# Patient Record
Sex: FEMALE | Race: ASIAN | NOT HISPANIC OR LATINO | URBAN - METROPOLITAN AREA
[De-identification: names, ages, dates, MRNs, and addresses within clinical notes are randomized per-mention and may not be internally consistent; named-entity substitution may affect disease eponyms.]

---

## 2017-01-11 ENCOUNTER — EMERGENCY (EMERGENCY)
Facility: HOSPITAL | Age: 47
LOS: 1 days | Discharge: LEFT BEFORE DISCHARGE | End: 2017-01-11
Attending: EMERGENCY MEDICINE | Admitting: EMERGENCY MEDICINE
Payer: COMMERCIAL

## 2017-01-11 VITALS
SYSTOLIC BLOOD PRESSURE: 158 MMHG | RESPIRATION RATE: 19 BRPM | OXYGEN SATURATION: 100 % | DIASTOLIC BLOOD PRESSURE: 87 MMHG | TEMPERATURE: 97 F | HEART RATE: 76 BPM

## 2017-01-11 PROCEDURE — 99284 EMERGENCY DEPT VISIT MOD MDM: CPT | Mod: 25

## 2017-01-11 PROCEDURE — 93010 ELECTROCARDIOGRAM REPORT: CPT | Mod: NC

## 2017-01-11 NOTE — ED ADULT TRIAGE NOTE - CHIEF COMPLAINT QUOTE
SOB and palpitation since half an hour ago while she was watching television. denies chest pain or nausea. c/o feeling dizzy and high BP -167/67

## 2017-01-12 VITALS
HEART RATE: 65 BPM | SYSTOLIC BLOOD PRESSURE: 140 MMHG | DIASTOLIC BLOOD PRESSURE: 62 MMHG | OXYGEN SATURATION: 100 % | RESPIRATION RATE: 18 BRPM

## 2017-01-12 PROCEDURE — 71010: CPT | Mod: 26

## 2017-01-12 NOTE — ED PROVIDER NOTE - CONDUCTED A DETAILED DISCUSSION WITH PATIENT AND/OR GUARDIAN REGARDING, MDM
return to ED if symptoms worsen, persist or questions arise/refused labs/need for outpatient follow-up

## 2017-01-12 NOTE — ED PROVIDER NOTE - NEUROLOGICAL, MLM
Alert and oriented, no focal deficits, no motor or sensory deficits. Alert and oriented, no focal deficits, no motor or sensory deficits. cn 2-12 intact bl. ms 5/5 bl ue and le. sensation intact bl. gait normal. finger to nose normal bl.

## 2017-01-12 NOTE — ED PROVIDER NOTE - CARE PLAN
Principal Discharge DX:	Palpitations  Instructions for follow-up, activity and diet:	pt refused labs, she wants to be discharged. Discussed return to the ED for any worsening symptoms.

## 2017-01-12 NOTE — ED PROVIDER NOTE - MEDICAL DECISION MAKING DETAILS
att47 y/o f with h/o GERD, presents with palpations, lightheadedness, sob and transient paresthesias/ subjective partial numbness/tingling in bl face and foot, bl this evening. No chest pain, no nausea. No HA. Pt states she has increase in stress, felt anxious, described like a panic attack. Pt had same exact symptoms x 2 in the past over the years. Pt was concerned her bp was high, systolic 160, and therefore came in to be seen. Pt see’s pcp and Dr Frank from [facility]. Normal stress echo 9/15. Pt states she had a recent Ct head. Normal neuro exam, clear lungs. Well appearing. Cardiac workup to r/u ACS, unlikely with presentation. Consider panic attack with negative workup. Discussed importance of f/u with pcp and cards. Pt states her pcp started anti anxiety medication but she didn’t want to take it.

## 2017-01-12 NOTE — ED PROVIDER NOTE - OBJECTIVE STATEMENT
Pt is a 46F with PMHx GERD p/w sudden onset palpitations @ 10pm today. Pt says that she was sitting and watching TV when she suddenly had a "hot flash" with head numbness, cold sweats, right foot numbness, nausea, and overall feeling of being "uncomfortable." She then felt her heart beating faster. Pt checked her BP and  so pt asked her  to take her to ED for fear of having a stroke. She repeated her BP with her home monitoring device a little while later and repeat . Pt denies fevers/chills/sweats, vomiting, diarrhea, dysuria, chest pain or SOB, lightheadedness, HA, focal neurological deficits, uncontrollable movements, anxiety/panic, LOC, or vision changes.     Of note, pt had similar episode 6/2015 with spontaneous resolution. Pt has a hx of bradycardia, which was evaluated in 2009 with a Holter monitor x1 week and was (-) for tachyarrhythmias. Stress test and TTE performed last year was wnl. Pt is a 46F with PMHx GERD p/w sudden onset palpitations @ 10pm today. Pt says that she was sitting and watching TV when she suddenly had a "hot flash" with head bl subjective numbness/tingling, cold sweats, right foot subjective numbness/tinging, nausea, and overall feeling of being "uncomfortable." She then felt her heart beating faster. Pt checked her BP and  so pt asked her  to take her to ED for fear of having a stroke. She repeated her BP with her home monitoring device a little while later and repeat . Pt denies fevers/chills/sweats, vomiting, diarrhea, dysuria, chest pain or SOB, lightheadedness, HA, focal neurological deficits, uncontrollable movements, anxiety/panic, LOC, or vision changes.     Of note, pt had similar episode with palpitations, sob, transient paresthesias 9/2015 with spontaneous resolution. Pt states she had 2 episodes like this. Pt has a hx of bradycardia, which was evaluated in 2009 with a Holter monitor x1 week and was (-) for tachyarrhythmias. Stress test and TTE performed last year was wnl.

## 2017-01-12 NOTE — ED PROVIDER NOTE - ATTENDING CONTRIBUTION TO CARE
see mdm  I have personally performed a face to face bedside history and physical examination of this patient. I have discussed the history, examination, review of systems, assessment and plan of management with the resident. I have reviewed the electronic medical record and amended it to reflect my history, review of systems, physical exam, assessment and plan.

## 2017-01-12 NOTE — ED ADULT NURSE NOTE - OBJECTIVE STATEMENT
47 yo Female received in spot 27.  Pt is A&Ox4.  Pt denies and CP or SOB.  Pt appears very anxious.  Pt states that she had an elevated BP earlier today at approx. 9pm.  BP was 167/89.  Pt has home automated wrist BP monitoring device.  Pt states that she felt her heart racing.  monitor history shows HR to be 88.  Pt states that she has seen her PCP about HTN concerns and it was determined that she does not need BP medications.  Pt states concerns that her BP is going to cause a stroke.  /52 upon RN exam of patient.  Vital signs as noted.  Will continue to monitor patient closely.  Perla WOLF.

## 2017-01-12 NOTE — ED PROVIDER NOTE - CONSTITUTIONAL, MLM
normal... Well appearing, well nourished, awake, alert, oriented to person, place, time/situation and in no apparent distress. nontoxic. comfortable. Well appearing, well nourished, awake, alert, oriented to person, place, time/situation and in no apparent distress.

## 2017-01-12 NOTE — ED PROVIDER NOTE - PROGRESS NOTE DETAILS
Pt refuses blood work in ED. She wants to go home now and f/u with her primary care doctor and cardiologist. Discussed importance of blood work and risk. she has capacity to refuse and understands the risk. Will return to ED for return of symptoms. Pt refuses blood work in ED. She wants to go home now and f/u with her primary care doctor and cardiologist. Discussed importance of blood work and risk. she has capacity to refuse and understands the risk. Will return to ED for return of symptoms.  Therefore patient is leaving before labs, signing out against medical advice. Copies of ekg given to patient.

## 2017-11-29 ENCOUNTER — INPATIENT (INPATIENT)
Facility: HOSPITAL | Age: 47
LOS: 4 days | Discharge: ANOTHER HOSPITAL | End: 2017-12-04
Attending: INTERNAL MEDICINE | Admitting: INTERNAL MEDICINE
Payer: COMMERCIAL

## 2017-11-29 VITALS
TEMPERATURE: 99 F | DIASTOLIC BLOOD PRESSURE: 57 MMHG | SYSTOLIC BLOOD PRESSURE: 116 MMHG | OXYGEN SATURATION: 100 % | HEART RATE: 67 BPM | RESPIRATION RATE: 16 BRPM

## 2017-11-29 DIAGNOSIS — I10 ESSENTIAL (PRIMARY) HYPERTENSION: ICD-10-CM

## 2017-11-29 DIAGNOSIS — I49.9 CARDIAC ARRHYTHMIA, UNSPECIFIED: ICD-10-CM

## 2017-11-29 DIAGNOSIS — R00.2 PALPITATIONS: ICD-10-CM

## 2017-11-29 PROBLEM — K21.9 GASTRO-ESOPHAGEAL REFLUX DISEASE WITHOUT ESOPHAGITIS: Chronic | Status: ACTIVE | Noted: 2017-01-12

## 2017-11-29 LAB
ALBUMIN SERPL ELPH-MCNC: 3.8 G/DL — SIGNIFICANT CHANGE UP (ref 3.3–5)
ALP SERPL-CCNC: 62 U/L — SIGNIFICANT CHANGE UP (ref 40–120)
ALT FLD-CCNC: 25 U/L — SIGNIFICANT CHANGE UP (ref 4–33)
AST SERPL-CCNC: 27 U/L — SIGNIFICANT CHANGE UP (ref 4–32)
BASOPHILS # BLD AUTO: 0.04 K/UL — SIGNIFICANT CHANGE UP (ref 0–0.2)
BASOPHILS NFR BLD AUTO: 0.7 % — SIGNIFICANT CHANGE UP (ref 0–2)
BILIRUB SERPL-MCNC: 0.4 MG/DL — SIGNIFICANT CHANGE UP (ref 0.2–1.2)
BUN SERPL-MCNC: 12 MG/DL — SIGNIFICANT CHANGE UP (ref 7–23)
CALCIUM SERPL-MCNC: 8.3 MG/DL — LOW (ref 8.4–10.5)
CHLORIDE SERPL-SCNC: 105 MMOL/L — SIGNIFICANT CHANGE UP (ref 98–107)
CK MB BLD-MCNC: 1.99 NG/ML — SIGNIFICANT CHANGE UP (ref 1–4.7)
CK MB BLD-MCNC: SIGNIFICANT CHANGE UP (ref 0–2.5)
CK SERPL-CCNC: 69 U/L — SIGNIFICANT CHANGE UP (ref 25–170)
CK SERPL-CCNC: 82 U/L — SIGNIFICANT CHANGE UP (ref 25–170)
CO2 SERPL-SCNC: 21 MMOL/L — LOW (ref 22–31)
CREAT SERPL-MCNC: 0.52 MG/DL — SIGNIFICANT CHANGE UP (ref 0.5–1.3)
EOSINOPHIL # BLD AUTO: 0.22 K/UL — SIGNIFICANT CHANGE UP (ref 0–0.5)
EOSINOPHIL NFR BLD AUTO: 4.1 % — SIGNIFICANT CHANGE UP (ref 0–6)
GLUCOSE SERPL-MCNC: 92 MG/DL — SIGNIFICANT CHANGE UP (ref 70–99)
HCT VFR BLD CALC: 38.8 % — SIGNIFICANT CHANGE UP (ref 34.5–45)
HGB BLD-MCNC: 12.8 G/DL — SIGNIFICANT CHANGE UP (ref 11.5–15.5)
IMM GRANULOCYTES # BLD AUTO: 0.01 # — SIGNIFICANT CHANGE UP
IMM GRANULOCYTES NFR BLD AUTO: 0.2 % — SIGNIFICANT CHANGE UP (ref 0–1.5)
LYMPHOCYTES # BLD AUTO: 1.95 K/UL — SIGNIFICANT CHANGE UP (ref 1–3.3)
LYMPHOCYTES # BLD AUTO: 35.9 % — SIGNIFICANT CHANGE UP (ref 13–44)
MCHC RBC-ENTMCNC: 27.8 PG — SIGNIFICANT CHANGE UP (ref 27–34)
MCHC RBC-ENTMCNC: 33 % — SIGNIFICANT CHANGE UP (ref 32–36)
MCV RBC AUTO: 84.3 FL — SIGNIFICANT CHANGE UP (ref 80–100)
MONOCYTES # BLD AUTO: 0.5 K/UL — SIGNIFICANT CHANGE UP (ref 0–0.9)
MONOCYTES NFR BLD AUTO: 9.2 % — SIGNIFICANT CHANGE UP (ref 2–14)
NEUTROPHILS # BLD AUTO: 2.71 K/UL — SIGNIFICANT CHANGE UP (ref 1.8–7.4)
NEUTROPHILS NFR BLD AUTO: 49.9 % — SIGNIFICANT CHANGE UP (ref 43–77)
NRBC # FLD: 0 — SIGNIFICANT CHANGE UP
PLATELET # BLD AUTO: 272 K/UL — SIGNIFICANT CHANGE UP (ref 150–400)
PMV BLD: 9.5 FL — SIGNIFICANT CHANGE UP (ref 7–13)
POTASSIUM SERPL-MCNC: 4 MMOL/L — SIGNIFICANT CHANGE UP (ref 3.5–5.3)
POTASSIUM SERPL-SCNC: 4 MMOL/L — SIGNIFICANT CHANGE UP (ref 3.5–5.3)
PROT SERPL-MCNC: 7.2 G/DL — SIGNIFICANT CHANGE UP (ref 6–8.3)
RBC # BLD: 4.6 M/UL — SIGNIFICANT CHANGE UP (ref 3.8–5.2)
RBC # FLD: 12.3 % — SIGNIFICANT CHANGE UP (ref 10.3–14.5)
SODIUM SERPL-SCNC: 139 MMOL/L — SIGNIFICANT CHANGE UP (ref 135–145)
TROPONIN T SERPL-MCNC: < 0.06 NG/ML — SIGNIFICANT CHANGE UP (ref 0–0.06)
TROPONIN T SERPL-MCNC: < 0.06 NG/ML — SIGNIFICANT CHANGE UP (ref 0–0.06)
TSH SERPL-MCNC: 0.64 UIU/ML — SIGNIFICANT CHANGE UP (ref 0.27–4.2)
WBC # BLD: 5.43 K/UL — SIGNIFICANT CHANGE UP (ref 3.8–10.5)
WBC # FLD AUTO: 5.43 K/UL — SIGNIFICANT CHANGE UP (ref 3.8–10.5)

## 2017-11-29 PROCEDURE — 71020: CPT | Mod: 26

## 2017-11-29 RX ORDER — ASPIRIN/CALCIUM CARB/MAGNESIUM 324 MG
325 TABLET ORAL ONCE
Qty: 0 | Refills: 0 | Status: COMPLETED | OUTPATIENT
Start: 2017-11-29 | End: 2017-11-29

## 2017-11-29 RX ORDER — ENOXAPARIN SODIUM 100 MG/ML
40 INJECTION SUBCUTANEOUS EVERY 24 HOURS
Qty: 0 | Refills: 0 | Status: DISCONTINUED | OUTPATIENT
Start: 2017-11-29 | End: 2017-12-01

## 2017-11-29 RX ADMIN — Medication 325 MILLIGRAM(S): at 09:06

## 2017-11-29 RX ADMIN — ENOXAPARIN SODIUM 40 MILLIGRAM(S): 100 INJECTION SUBCUTANEOUS at 17:04

## 2017-11-29 NOTE — H&P ADULT - NEUROLOGICAL DETAILS
alert and oriented x 3/sensation intact/normal strength/no spontaneous movement/responds to verbal commands/responds to pain

## 2017-11-29 NOTE — H&P ADULT - ASSESSMENT
48yo F w/ PMH of HTN p/w palpitations and lightheadedness x2d being admitted to telemetry for arrhythmia.

## 2017-11-29 NOTE — ED PROVIDER NOTE - OBJECTIVE STATEMENT
Pt is 46 y/o female with no significant PMhx here for eval of palpitations and sob x 3 days. As per pt she has been experiencing "sensation of heart skipping a beat", also sob (worse while laying down), denies CP, diaphoresis, neck, LUE pain/parathesias, denies jaw pain, denies prolonged immobilization, personal/family hx of coagulopathies, non smoker, not on exogenous estrogen, denies leg pain/swelling. (-) abd pain, n/v/d. Pt admits that she had similar sx in the past, had Holter last year (uneventful), also had neg echo/stress test at the beginning of the year, follows up with Dr David Frank; Denies illicit substances, coffeine/energy drinks use.

## 2017-11-29 NOTE — H&P ADULT - ADDITIONAL PE
Vitals: T-98.2, BP-117/64, HR-62, RR-16, SpO2-99  Labs: -CBC: nl. -Chem20: low CO2 and calcium. -Cardiac enzymes: negative  CXR: lungs clear, heart nl size. EKG: SR w/ PVC's

## 2017-11-29 NOTE — H&P ADULT - PROBLEM SELECTOR PLAN 1
Admit to telemetry. Admit to telemetry. serial CE's. Check FLP, A1C. F/U Cardiology c/s Dr Frank Admit to telemetry. serial CE's. Check FLP, A1C. F/U Cardiology c/s Dr Frank, EP c/s Dr Davison called

## 2017-11-29 NOTE — H&P ADULT - ATTENDING COMMENTS
47 year old female with history of Arhythmia presented with palpitations and lightheadedness for pat week  In ER, showing PVC's Bigeminys - admitted - patient symptomatic  Cardiology notified and advised to admit  serial cardiac enzymes  cardiac monitoring  check TFT   check echo'  Cardio/EPS evaluation  discussed with admitting PA  GI DVT prophylaxis

## 2017-11-29 NOTE — ED PROVIDER NOTE - FAMILY HISTORY
Father  Still living? Unknown  Family history of diabetes mellitus, Age at diagnosis: Age Unknown     Mother  Still living? Unknown  Family history of mitral valve repair, Age at diagnosis: Age Unknown

## 2017-11-29 NOTE — H&P ADULT - NSHPSOCIALHISTORY_GEN_ALL_CORE
Pt is a  48yo F currently living with her  and two children. Pt denies tobacco, alcohol and drug use. Pt is currently employed as a dialysis technician. Pt is a  48yo F currently living with her  and two children. Pt denies tobacco, alcohol and drug use.   Pt is currently employed as a dialysis technician.

## 2017-11-29 NOTE — ED ADULT NURSE REASSESSMENT NOTE - NS ED NURSE REASSESS COMMENT FT1
Received pt from LUCIANO Ha, pt A&Ox3, am Received pt from LUCIANO Ha, pt A&Ox3, ambulatory at baseline without assistance, here for evaluation of palpitations. Denies any chest pain, dizziness, nausea, vomiting, shortness of breath, diarrhea, fever, constipation, or chills. Pt feels as though here heart is pounding.  Pt on telemetry-NSR @ 68 noted with occasional unifocal PVCs. Will continue to monitor.

## 2017-11-29 NOTE — ED PROVIDER NOTE - PROGRESS NOTE DETAILS
NIESHA Ryan - spoke with pt's cardiologist David Sandra - will admit to DR Cheng's service (called and notified), DR Frank will be consulting, if EP consult needed, will call Dr Davison/

## 2017-11-29 NOTE — H&P ADULT - HISTORY OF PRESENT ILLNESS
46yo F w/ PMH of HTN p/w palpitations and lightheadedness x2d. Pt states that the symptoms have been increasing since onset. Pt states that she has experienced these sx 4 other times in the past 3 years. Each time she experienced the sx the patient presented to the ED, but a cause of her sx was not determined. Pt has received an ECHO and a stress test in the past year both of which were found to be normal. Pt was also placed on a holter monitor x1wk this past year which had no abnormal findings.  Pt also c/o easy fatigue when climbing the stairs, generalized weakness, and SOB, but could not give a time frame for these sx.  Pt states that she may have gained some weight recently, but was unsure of how much and she had recently traveled to [place] in mid-November. Pt denies fever, chills, night sweats, dyspnea, cough, syncope/LOC, claudication, lower extremity edema, and changes in urination. 46yo F w/ PMH of HTN p/w constant palpitations x2days, worse lying supine. Also c/o lightheadedness intermittently 2 days, lasting 5-10 minute per epsiode. Pt states that the symptoms have been increasing since onset. Pt has experienced these sx 4 other times in the past 3 years. Each time she experienced the sx the patient presented to the ED, but a cause of her sx was not determined. Pt has received an ECHO and a stress test in the past year both of which were found to be normal. Pt was also placed on a holter monitor x1wk this past year which had no abnormal findings reportedly.  Pt also c/o easy fatigue when climbing the stairs, generalized weakness, and SOB, but could not give a time frame for these sx.  Pt states that she may have gained some weight recently, but was unsure of how much and she had recently traveled to [place] in mid-November. Pt denies CP fever, chills, night sweats, dyspnea, cough, syncope/LOC, claudication, lower extremity edema, and changes in urination.

## 2017-11-29 NOTE — ED PROVIDER NOTE - ATTENDING CONTRIBUTION TO CARE
47F p/w palpitations, SOB and orthopnea x 2-3 days, feeling like her heart is skipping a beat.  Similar to previous episode which led to an unrevealing cardiac w/u with Dr SAMY Frank.  No caffeine.  Recent travel to [place], was not ill, no leg swelling or pain.  Also having some chest discomfort.  VS:  unremarkable    GEN - mild distress palps, sitting upright in bed A+O x3   HEAD - NC/AT     ENT - PEERL, EOMI, mucous membranes  moist , no discharge      NECK: Neck supple, non-tender without lymphadenopathy, no masses, no JVD  PULM - CTA b/l,  symmetric breath sounds  COR -  normal heart sounds    ABD - , ND, NT, soft, no guarding, no rebound, no masses    BACK - no CVA tenderness, nontender spine     EXTREMS - no edema, no deformity, warm and well perfused    SKIN - no rash or bruising      NEUROLOGIC - alert, CN 2-12 intact, sensation nl, motor 5/5 RUE/LUE/RLE/LLE.      IMP:  47F p/w palps and SOB worse with lying flat.  No JVD.  clear heart sounds.  Clear lungs.  No leg swelling or pain.  EKG reveals ventricular bigeminy.  Not intrinsically dangerous however may represent some form of cardiomyopathy vs electrolyte abnormality vs cardiac remodeling.  Check labs, CE, TSH, CXR, to be d/w cards Dr Duffy.  Pt still symptomatic, would not discharge at this point.

## 2017-11-29 NOTE — ED ADULT NURSE NOTE - OBJECTIVE STATEMENT
pt. received in room 15 , A&Ox3 c/o  low hr and palpitations since Monday. Pt . states she has a family cardiac hx and is just concern. Pt. appears comfortable in bed , HR in triage 67. Pt. states palpitations are intermittent. Pt. Vitals as noted, and in no acute distress. Will continue to monitor while in the ED.

## 2017-11-29 NOTE — ED ADULT TRIAGE NOTE - CHIEF COMPLAINT QUOTE
c.o "low heartrate", palpitations, dizziness since monday. hr normal in triage. pt states she still feels dizzy at this time. pmh bradycardia

## 2017-11-30 LAB
ALBUMIN SERPL ELPH-MCNC: 3.5 G/DL — SIGNIFICANT CHANGE UP (ref 3.3–5)
ALP SERPL-CCNC: 58 U/L — SIGNIFICANT CHANGE UP (ref 40–120)
ALT FLD-CCNC: 21 U/L — SIGNIFICANT CHANGE UP (ref 4–33)
AST SERPL-CCNC: 23 U/L — SIGNIFICANT CHANGE UP (ref 4–32)
BASOPHILS # BLD AUTO: 0.05 K/UL — SIGNIFICANT CHANGE UP (ref 0–0.2)
BASOPHILS NFR BLD AUTO: 1 % — SIGNIFICANT CHANGE UP (ref 0–2)
BILIRUB SERPL-MCNC: 0.3 MG/DL — SIGNIFICANT CHANGE UP (ref 0.2–1.2)
BUN SERPL-MCNC: 17 MG/DL — SIGNIFICANT CHANGE UP (ref 7–23)
CALCIUM SERPL-MCNC: 8.2 MG/DL — LOW (ref 8.4–10.5)
CHLORIDE SERPL-SCNC: 106 MMOL/L — SIGNIFICANT CHANGE UP (ref 98–107)
CO2 SERPL-SCNC: 24 MMOL/L — SIGNIFICANT CHANGE UP (ref 22–31)
CREAT SERPL-MCNC: 0.7 MG/DL — SIGNIFICANT CHANGE UP (ref 0.5–1.3)
EOSINOPHIL # BLD AUTO: 0.28 K/UL — SIGNIFICANT CHANGE UP (ref 0–0.5)
EOSINOPHIL NFR BLD AUTO: 5.4 % — SIGNIFICANT CHANGE UP (ref 0–6)
GLUCOSE SERPL-MCNC: 104 MG/DL — HIGH (ref 70–99)
HCT VFR BLD CALC: 37.3 % — SIGNIFICANT CHANGE UP (ref 34.5–45)
HGB BLD-MCNC: 12.6 G/DL — SIGNIFICANT CHANGE UP (ref 11.5–15.5)
IMM GRANULOCYTES # BLD AUTO: 0.01 # — SIGNIFICANT CHANGE UP
IMM GRANULOCYTES NFR BLD AUTO: 0.2 % — SIGNIFICANT CHANGE UP (ref 0–1.5)
LYMPHOCYTES # BLD AUTO: 1.59 K/UL — SIGNIFICANT CHANGE UP (ref 1–3.3)
LYMPHOCYTES # BLD AUTO: 30.5 % — SIGNIFICANT CHANGE UP (ref 13–44)
MAGNESIUM SERPL-MCNC: 2 MG/DL — SIGNIFICANT CHANGE UP (ref 1.6–2.6)
MCHC RBC-ENTMCNC: 28.6 PG — SIGNIFICANT CHANGE UP (ref 27–34)
MCHC RBC-ENTMCNC: 33.8 % — SIGNIFICANT CHANGE UP (ref 32–36)
MCV RBC AUTO: 84.8 FL — SIGNIFICANT CHANGE UP (ref 80–100)
MONOCYTES # BLD AUTO: 0.48 K/UL — SIGNIFICANT CHANGE UP (ref 0–0.9)
MONOCYTES NFR BLD AUTO: 9.2 % — SIGNIFICANT CHANGE UP (ref 2–14)
NEUTROPHILS # BLD AUTO: 2.8 K/UL — SIGNIFICANT CHANGE UP (ref 1.8–7.4)
NEUTROPHILS NFR BLD AUTO: 53.7 % — SIGNIFICANT CHANGE UP (ref 43–77)
NRBC # FLD: 0 — SIGNIFICANT CHANGE UP
PHOSPHATE SERPL-MCNC: 3.4 MG/DL — SIGNIFICANT CHANGE UP (ref 2.5–4.5)
PLATELET # BLD AUTO: 254 K/UL — SIGNIFICANT CHANGE UP (ref 150–400)
PMV BLD: 9.7 FL — SIGNIFICANT CHANGE UP (ref 7–13)
POTASSIUM SERPL-MCNC: 3.8 MMOL/L — SIGNIFICANT CHANGE UP (ref 3.5–5.3)
POTASSIUM SERPL-SCNC: 3.8 MMOL/L — SIGNIFICANT CHANGE UP (ref 3.5–5.3)
PROT SERPL-MCNC: 6.7 G/DL — SIGNIFICANT CHANGE UP (ref 6–8.3)
RBC # BLD: 4.4 M/UL — SIGNIFICANT CHANGE UP (ref 3.8–5.2)
RBC # FLD: 12.3 % — SIGNIFICANT CHANGE UP (ref 10.3–14.5)
SODIUM SERPL-SCNC: 141 MMOL/L — SIGNIFICANT CHANGE UP (ref 135–145)
WBC # BLD: 5.21 K/UL — SIGNIFICANT CHANGE UP (ref 3.8–10.5)
WBC # FLD AUTO: 5.21 K/UL — SIGNIFICANT CHANGE UP (ref 3.8–10.5)

## 2017-11-30 PROCEDURE — 93306 TTE W/DOPPLER COMPLETE: CPT | Mod: 26

## 2017-11-30 NOTE — PROGRESS NOTE ADULT - ASSESSMENT
· Assessment	  46yo F w/ PMH of HTN p/w palpitations and lightheadedness x 2d being admitted to telemetry for arrhythmia.     Problem/Plan - 1:  ·  Problem: Arrhythmia. Sinus Bradycardia Premature Beats    continue cardiac monitoring  ECHO reviewed - EF normal  Cardiology - awaiting evaluation  EPS - awaiting evaluation     Problem/Plan - 2:  ·  Problem: HTN (hypertension).    Plan: DASH diet.   off medications ; continue to monitor

## 2017-11-30 NOTE — PROGRESS NOTE ADULT - SUBJECTIVE AND OBJECTIVE BOX
Patient is a 47y old  Female who presents with a chief complaint of "my heart has been pounding" (29 Nov 2017 14:33)      SUBJECTIVE / OVERNIGHT EVENTS:  cardiac monitoring - NSR with occasional  PVC  she feels okay  Denies CP/SOB/Palpitation/HA.    MEDICATIONS  (STANDING):  enoxaparin Injectable 40 milliGRAM(s) SubCutaneous every 24 hours    MEDICATIONS  (PRN):    CAPILLARY BLOOD GLUCOSE  PHYSICAL EXAM:  GENERAL: NAD, well-developed  HEAD:  Atraumatic, Normocephalic  EYES: EOMI, PERRLA, conjunctiva and sclera clear  NECK: Supple, No JVD  CHEST/LUNG: Clear to auscultation bilaterally; No wheeze  HEART: Regular rate and rhythm; No murmurs, rubs, or gallops  ABDOMEN: Soft, Nontender, Nondistended; Bowel sounds present  EXTREMITIES:  2+ Peripheral Pulses, No clubbing, cyanosis, or edema  PSYCH: AAOx3  NEUROLOGY: non-focal  SKIN: No rashes or lesions    LABS:                        12.6   5.21  )-----------( 254      ( 30 Nov 2017 06:00 )             37.3     11-30    141  |  106  |  17  ----------------------------<  104<H>  3.8   |  24  |  0.70    Ca    8.2<L>      30 Nov 2017 06:00  Phos  3.4     11-30  Mg     2.0     11-30    TPro  6.7  /  Alb  3.5  /  TBili  0.3  /  DBili  x   /  AST  23  /  ALT  21  /  AlkPhos  58  11-30      CARDIAC MARKERS ( 29 Nov 2017 13:48 )  x     / < 0.06 ng/mL / 69 u/L / x     / x      CARDIAC MARKERS ( 29 Nov 2017 07:58 )  x     / < 0.06 ng/mL / 82 u/L / 1.99 ng/mL / x        RADIOLOGY & ADDITIONAL TESTS:    Imaging Personally Reviewed: yes    Consultant(s) Notes Reviewed:  awaiting consults    Care Discussed with Consultants/Other Providers: yes

## 2017-12-01 LAB
BUN SERPL-MCNC: 16 MG/DL — SIGNIFICANT CHANGE UP (ref 7–23)
CALCIUM SERPL-MCNC: 8.4 MG/DL — SIGNIFICANT CHANGE UP (ref 8.4–10.5)
CHLORIDE SERPL-SCNC: 104 MMOL/L — SIGNIFICANT CHANGE UP (ref 98–107)
CO2 SERPL-SCNC: 25 MMOL/L — SIGNIFICANT CHANGE UP (ref 22–31)
CREAT SERPL-MCNC: 0.64 MG/DL — SIGNIFICANT CHANGE UP (ref 0.5–1.3)
GLUCOSE SERPL-MCNC: 94 MG/DL — SIGNIFICANT CHANGE UP (ref 70–99)
HCT VFR BLD CALC: 39.7 % — SIGNIFICANT CHANGE UP (ref 34.5–45)
HGB BLD-MCNC: 13 G/DL — SIGNIFICANT CHANGE UP (ref 11.5–15.5)
MAGNESIUM SERPL-MCNC: 2.1 MG/DL — SIGNIFICANT CHANGE UP (ref 1.6–2.6)
MCHC RBC-ENTMCNC: 28 PG — SIGNIFICANT CHANGE UP (ref 27–34)
MCHC RBC-ENTMCNC: 32.7 % — SIGNIFICANT CHANGE UP (ref 32–36)
MCV RBC AUTO: 85.4 FL — SIGNIFICANT CHANGE UP (ref 80–100)
NRBC # FLD: 0 — SIGNIFICANT CHANGE UP
PLATELET # BLD AUTO: 286 K/UL — SIGNIFICANT CHANGE UP (ref 150–400)
PMV BLD: 9.9 FL — SIGNIFICANT CHANGE UP (ref 7–13)
POTASSIUM SERPL-MCNC: 3.7 MMOL/L — SIGNIFICANT CHANGE UP (ref 3.5–5.3)
POTASSIUM SERPL-SCNC: 3.7 MMOL/L — SIGNIFICANT CHANGE UP (ref 3.5–5.3)
RBC # BLD: 4.65 M/UL — SIGNIFICANT CHANGE UP (ref 3.8–5.2)
RBC # FLD: 12.4 % — SIGNIFICANT CHANGE UP (ref 10.3–14.5)
SODIUM SERPL-SCNC: 140 MMOL/L — SIGNIFICANT CHANGE UP (ref 135–145)
WBC # BLD: 4.47 K/UL — SIGNIFICANT CHANGE UP (ref 3.8–10.5)
WBC # FLD AUTO: 4.47 K/UL — SIGNIFICANT CHANGE UP (ref 3.8–10.5)

## 2017-12-01 PROCEDURE — 93010 ELECTROCARDIOGRAM REPORT: CPT

## 2017-12-01 RX ORDER — HEPARIN SODIUM 5000 [USP'U]/ML
5000 INJECTION INTRAVENOUS; SUBCUTANEOUS EVERY 12 HOURS
Qty: 0 | Refills: 0 | Status: DISCONTINUED | OUTPATIENT
Start: 2017-12-01 | End: 2017-12-04

## 2017-12-01 NOTE — PROGRESS NOTE ADULT - SUBJECTIVE AND OBJECTIVE BOX
Patient is a 47y old  Female who presents with a chief complaint of "my heart has been pounding" (29 Nov 2017 14:33)      SUBJECTIVE / OVERNIGHT EVENTS:   Feels better.  Denies CP/SOB/Palpitation/HA.  cardiac monitor - NSR with PVC's  Appreciate consult notes  MEDICATIONS  (STANDING):  heparin  Injectable 5000 Unit(s) SubCutaneous every 12 hours        I&O's Summary      PHYSICAL EXAM:  GENERAL: NAD, well-developed  HEAD:  Atraumatic, Normocephalic  EYES: EOMI, PERRLA, conjunctiva and sclera clear  NECK: Supple, No JVD  CHEST/LUNG: Clear to auscultation bilaterally; No wheeze  HEART: Regular rate and rhythm; No murmurs, rubs, or gallops  ABDOMEN: Soft, Nontender, Nondistended; Bowel sounds present  EXTREMITIES:  2+ Peripheral Pulses, No clubbing, cyanosis, or edema  PSYCH: AAOx3  NEUROLOGY: non-focal  SKIN: No rashes or lesions    LABS:                        13.0   4.47  )-----------( 286      ( 01 Dec 2017 06:09 )             39.7     12-01    140  |  104  |  16  ----------------------------<  94  3.7   |  25  |  0.64    Ca    8.4      01 Dec 2017 06:09  Phos  3.4     11-30  Mg     2.1     12-01    TPro  6.7  /  Alb  3.5  /  TBili  0.3  /  DBili  x   /  AST  23  /  ALT  21  /  AlkPhos  58  11-30        RADIOLOGY & ADDITIONAL TESTS:    Imaging Personally Reviewed: yes     Consultant(s) Notes Reviewed:  yes    Care Discussed with Consultants/Other Providers: yes and agree with plan

## 2017-12-01 NOTE — CHART NOTE - NSCHARTNOTEFT_GEN_A_CORE
Notified by RN pt's HR dropped to 36 bpm while sleeping, and initial pause of 2.1 secs followed by 2.6 seconds pause.   Patient c/o palpitations but denies any chest pain, dyspnea, dizziness, near-syncope/syncope       VITAL SIGNS:  Vital Signs Last 24 Hrs  T(C): 36.8 (01 Dec 2017 03:07), Max: 37.2 (30 Nov 2017 21:21)  T(F): 98.2 (01 Dec 2017 03:07), Max: 98.9 (30 Nov 2017 21:21)  HR: 53 (01 Dec 2017 03:07) (53 - 77)  BP: 111/57 (01 Dec 2017 03:07) (95/56 - 132/72)  RR: 17 (01 Dec 2017 03:07) (16 - 18)  SpO2: 99% (01 Dec 2017 03:07) (96% - 100%)    O:  Well developed, well nourished  middle aged Female found sitting in bed  alert and oriented x 3 appeared in no acute distress    No Known Allergies    MEDICATIONS  (STANDING):  enoxaparin Injectable 40 milliGRAM(s) SubCutaneous every 24 hours    MEDICATIONS  (PRN):                            12.6   5.21  )-----------( 254      ( 30 Nov 2017 06:00 )             37.3     11-30    141  |  106  |  17  ----------------------------<  104<H>  3.8   |  24  |  0.70    Ca    8.2<L>      30 Nov 2017 06:00  Phos  3.4     11-30  Mg     2.0     11-30    TPro  6.7  /  Alb  3.5  /  TBili  0.3  /  DBili  x   /  AST  23  /  ALT  21  /  AlkPhos  58  11-30    CARDIAC MARKERS ( 29 Nov 2017 13:48 )  x     / < 0.06 ng/mL / 69 u/L / x     / x      CARDIAC MARKERS ( 29 Nov 2017 07:58 )  x     / < 0.06 ng/mL / 82 u/L / 1.99 ng/mL / x        EKG: Sinus Bradycardia 54 bpm w frequent PVCs  Tele - Sinus cindi w frequent PVCs, intermittent bigeminy pattern    Assessment / Plan: 48 y/o female admitted with dizziness and palpitations, noted to have PVCs on tele monitor, CE x 2 neg, Echo WNL, awaiting cardiology consult with Dr Frank, and EP eval with Dr Davison pending, pt hemodynamically stable,  will continue to monitor

## 2017-12-01 NOTE — PROGRESS NOTE ADULT - SUBJECTIVE AND OBJECTIVE BOX
Pt known from PVT office and PVT MD Dr. Rubi this 48 y/o female with hx significant for HTN who has been experiencing palpitations for 2 years now worsening over last few days--more frequent and more severe. Outpatient echo and NST have been normal. Pt denies SOB, dizziness and LOC.  Medical therapy has been limited by significant sinus bradycardia.     PAST MEDICAL & SURGICAL HISTORY:  HTN (hypertension)  Gastroesophageal reflux disease without esophagitis  monomorphic PVCs  No significant past surgical history    MEDICATIONS  (STANDING):  enoxaparin Injectable 40 milliGRAM(s) SubCutaneous every 24 hours    Allergies: NKDA    FAMILY HISTORY:  Family history of mitral valve repair (Mother)  Family history of diabetes mellitus (Father)  Non-contributary for premature coronary disease or sudden cardiac death    SOCIAL HISTORY:    [x ] Non-smoker  [ ] Smoker  [ ] Alcohol      REVIEW OF SYSTEMS:  [ ]chest pain  [  ]shortness of breath  [ x ]palpitations  [  ]syncope  [ x]near syncope [ ]upper extremity weakness   [ ] lower extremity weakness  [  ]diplopia  [  ]altered mental status   [  ]fevers  [ ]chills [ ]nausea  [ ]vomitting  [  ]dysphagia    [ ]abdominal pain  [ ]melena  [ ]BRBPR    [  ]epistaxis  [  ]rash    [ ]lower extremity edema        [x ] All others negative	  [ ] Unable to obtain    PHYSICAL EXAM:  T(C): 37.1 (12-01-17 @ 13:40), Max: 37.2 (11-30-17 @ 21:21)  HR: 70 (12-01-17 @ 13:40) (53 - 77)  BP: 125/54 (12-01-17 @ 13:40) (101/58 - 132/72)  RR: 18 (12-01-17 @ 13:40) (17 - 18)  SpO2: 100% (12-01-17 @ 13:40) (96% - 100%)  Wt(kg): --    Gen: mid aged female NAD  Neck: no JVD  CVS: s1s2 irregular, no murmurs  Lungs: CTAB  Abd: soft nt/nd  Ext: no c/c/e  CNS: aao x 3 non-focal  Skin: no lesions    TELEMETRY: 	NSR with frequent PVCs  EKG shows frequent monomorphic PVCs as follows (LBBB, inferior axis, transition at V3, negative in aVL, positive in I) 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    Echo: normal  NST: normal  Cath: n/a  	  	  LABS:	 	                          13.0   4.47  )-----------( 286      ( 01 Dec 2017 06:09 )             39.7     12-01    140  |  104  |  16  ----------------------------<  94  3.7   |  25  |  0.64    Ca    8.4      01 Dec 2017 06:09  Phos  3.4     11-30  Mg     2.1     12-01    TPro  6.7  /  Alb  3.5  /  TBili  0.3  /  DBili  x   /  AST  23  /  ALT  21  /  AlkPhos  58  11-30

## 2017-12-01 NOTE — PROGRESS NOTE ADULT - ASSESSMENT
48 y/o female with hx significant for HTN who has been experiencing palpitations for 2 years now worsening over last few days--more frequent and more severe.     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 Davison for EPS and PVC ablation and possible loop recorder post procedure

## 2017-12-01 NOTE — PROGRESS NOTE ADULT - ASSESSMENT
Assessment	  46yo F w/ PMH of HTN p/w palpitations and lightheadedness x 2d being admitted to telemetry for arrhythmia.    Patient clinically stable  episode of bradycardia overnite  cardiac monitor - stable  Appreciate cardiology and EPS evaluation  for ablation on Monday       Problem/Plan - 1:  ·  Problem: Arrhythmia. Sinus Bradycardia Premature Beats    continue cardiac monitoring  ECHO reviewed - EF normal  Cardiology notes appreciated  EPS evaluation appreciated     Problem/Plan - 2:  ·  Problem: HTN (hypertension).    Plan: DASH diet.   off medications ; continue to monitor      Attending Attestation:   plan for ablation on Monday at [facility]  continue cardiac monitoring  continue current medical management  GI DVT prophylaxis.     I was physically present for the key portions of the evaluation and management (E/M) service provided.  I agree with the above history, physical, and plan which I have reviewed and edited where appropriate.     55 minutes spent on total encounter; more than 50% of the visit was spent counseling and/or coordinating care by the attending physician.     Plan discussed with family and patient and consultants

## 2017-12-01 NOTE — CONSULT NOTE ADULT - SUBJECTIVE AND OBJECTIVE BOX
EP ATTENDING      HISTORY OF PRESENT ILLNESS: She is a pleasant 48 y/o female PMH HTN who has been experiencing palpitations for 2 years. Outpatient echo and NST have been normal. 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 Medical therapy has been limited by significant sinus bradycardia. She denies high risk features such as angina or syncope.    PAST MEDICAL & SURGICAL HISTORY:  HTN (hypertension)  Gastroesophageal reflux disease without esophagitis  monomorphic PVCs  No significant past surgical history    MEDICATIONS  (STANDING):  enoxaparin Injectable 40 milliGRAM(s) SubCutaneous every 24 hours    Allergies    FAMILY HISTORY:  Family history of mitral valve repair (Mother)  Family history of diabetes mellitus (Father)  Non-contributary for premature coronary disease or sudden cardiac death    SOCIAL HISTORY:    [x ] Non-smoker  [ ] Smoker  [ ] Alcohol      REVIEW OF SYSTEMS:  [ ]chest pain  [  ]shortness of breath  [ x ]palpitations  [  ]syncope  [ x]near syncope [ ]upper extremity weakness   [ ] lower extremity weakness  [  ]diplopia  [  ]altered mental status   [  ]fevers  [ ]chills [ ]nausea  [ ]vomitting  [  ]dysphagia    [ ]abdominal pain  [ ]melena  [ ]BRBPR    [  ]epistaxis  [  ]rash    [ ]lower extremity edema        [x ] All others negative	  [ ] Unable to obtain    PHYSICAL EXAM:  T(C): 37.1 (12-01-17 @ 13:40), Max: 37.2 (11-30-17 @ 21:21)  HR: 70 (12-01-17 @ 13:40) (53 - 77)  BP: 125/54 (12-01-17 @ 13:40) (101/58 - 132/72)  RR: 18 (12-01-17 @ 13:40) (17 - 18)  SpO2: 100% (12-01-17 @ 13:40) (96% - 100%)  Wt(kg): --    no JVD  RRR, no murmurs  CTAB  soft nt/nd  no c/c/e    TELEMETRY: 	  as above  ECG:  	normal EKG    Echo: normal  NST: normal  Cath: n/a  	  	  LABS:	 	                          13.0   4.47  )-----------( 286      ( 01 Dec 2017 06:09 )             39.7     12-01    140  |  104  |  16  ----------------------------<  94  3.7   |  25  |  0.64    Ca    8.4      01 Dec 2017 06:09  Phos  3.4     11-30  Mg     2.1     12-01    TPro  6.7  /  Alb  3.5  /  TBili  0.3  /  DBili  x   /  AST  23  /  ALT  21  /  AlkPhos  58  11-30    proBNP:   Lipid Profile:   HgA1c:   TSH:     A/P) She is a pleasant 48 y/o female PMH HTN who has been experiencing palpitations for 2 years. Outpatient echo and NST have been normal. 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 Medical therapy has been limited by significant sinus bradycardia. She denies high risk features such as angina or syncope.    -given the above I discussed the R/B/A of medical therapy vs. PVC ablation. I cited a 30-40% efficacy of medical therapy, including the side effects of worsening her bradycardia but a near 0% medical risk. I cited a 75-80% efficacy with PVC ablation, along with a 3% risk of bleeding or infection and 1% risk of major complication including but not limited to heart attack, stroke, death, or need for emergency open heart surgery or pericardiocentesis. Understanding her R/B/A she elects PVC ablation given that medical therapy is limited by underlying sinus bradycardia  -check HCG  -NPO after midnight Sunday night  -will be transferred to [place] Monday 6 AM (please prepare d/c paperwork)  -PVC ablation Monday 8 AM  -d/w Dr Zac Davison M.D., RS  Cardiac Electrophysiology  [phone]

## 2017-12-02 ENCOUNTER — TRANSCRIPTION ENCOUNTER (OUTPATIENT)
Age: 47
End: 2017-12-02

## 2017-12-02 LAB
BUN SERPL-MCNC: 22 MG/DL — SIGNIFICANT CHANGE UP (ref 7–23)
CALCIUM SERPL-MCNC: 8.8 MG/DL — SIGNIFICANT CHANGE UP (ref 8.4–10.5)
CHLORIDE SERPL-SCNC: 104 MMOL/L — SIGNIFICANT CHANGE UP (ref 98–107)
CO2 SERPL-SCNC: 24 MMOL/L — SIGNIFICANT CHANGE UP (ref 22–31)
CREAT SERPL-MCNC: 0.67 MG/DL — SIGNIFICANT CHANGE UP (ref 0.5–1.3)
GLUCOSE SERPL-MCNC: 95 MG/DL — SIGNIFICANT CHANGE UP (ref 70–99)
HCG SERPL-ACNC: < 5 MIU/ML — SIGNIFICANT CHANGE UP
HCT VFR BLD CALC: 41.2 % — SIGNIFICANT CHANGE UP (ref 34.5–45)
HGB BLD-MCNC: 13.3 G/DL — SIGNIFICANT CHANGE UP (ref 11.5–15.5)
MAGNESIUM SERPL-MCNC: 2 MG/DL — SIGNIFICANT CHANGE UP (ref 1.6–2.6)
MCHC RBC-ENTMCNC: 28.1 PG — SIGNIFICANT CHANGE UP (ref 27–34)
MCHC RBC-ENTMCNC: 32.3 % — SIGNIFICANT CHANGE UP (ref 32–36)
MCV RBC AUTO: 86.9 FL — SIGNIFICANT CHANGE UP (ref 80–100)
NRBC # FLD: 0 — SIGNIFICANT CHANGE UP
PLATELET # BLD AUTO: 292 K/UL — SIGNIFICANT CHANGE UP (ref 150–400)
PMV BLD: 10 FL — SIGNIFICANT CHANGE UP (ref 7–13)
POTASSIUM SERPL-MCNC: 4.4 MMOL/L — SIGNIFICANT CHANGE UP (ref 3.5–5.3)
POTASSIUM SERPL-SCNC: 4.4 MMOL/L — SIGNIFICANT CHANGE UP (ref 3.5–5.3)
RBC # BLD: 4.74 M/UL — SIGNIFICANT CHANGE UP (ref 3.8–5.2)
RBC # FLD: 12.2 % — SIGNIFICANT CHANGE UP (ref 10.3–14.5)
SODIUM SERPL-SCNC: 142 MMOL/L — SIGNIFICANT CHANGE UP (ref 135–145)
WBC # BLD: 5.42 K/UL — SIGNIFICANT CHANGE UP (ref 3.8–10.5)
WBC # FLD AUTO: 5.42 K/UL — SIGNIFICANT CHANGE UP (ref 3.8–10.5)

## 2017-12-02 RX ADMIN — HEPARIN SODIUM 5000 UNIT(S): 5000 INJECTION INTRAVENOUS; SUBCUTANEOUS at 06:03

## 2017-12-02 NOTE — DISCHARGE NOTE ADULT - PLAN OF CARE
kalyn to [facility] for PVC ablation activity - as tolerated and with assistance   NPO on call to [facility]

## 2017-12-02 NOTE — PROGRESS NOTE ADULT - ASSESSMENT
· Assessment		  48yo F w/ PMH of HTN p/w palpitations and lightheadedness x 2d being admitted to telemetry for arrhythmia.    Patient comfortable in bed  ambulatory, no complains  cardiac monitor - frequent PVC's  episode of prolonged pauses  Cardiology and EPS on the case  plan : ablation on Monday       Problem/Plan - 1:  ·  Problem: Arrhythmia. Sinus Bradycardia Premature Beats prolonged pauses    continue cardiac monitoring  ECHO reviewed - EF normal  Cardiology on the case  EPS on the case     Problem/Plan - 2:  ·  Problem: HTN (hypertension).    Plan: DASH diet.   off medications ; continue to monitor      Attending Attestation:   appreciate Cardiology and EPS input  plan for ablation on Monday - to transfer to [facility]  continue cardiac monitoring  continue current medical management  GI DVT prophylaxis.     I was physically present for the key portions of the evaluation and management (E/M) service provided.  I agree with the above history, physical, and plan which I have reviewed and edited where appropriate.     45 minutes spent on total encounter; more than 50% of the visit was spent counseling and/or coordinating care by the attending physician.     Plan discussed with family and patient and consultants

## 2017-12-02 NOTE — PROGRESS NOTE ADULT - ASSESSMENT
46 y/o female with hx significant for HTN who has been experiencing palpitations for 2 years now worsening over last few days--more frequent and more severe.     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 Davison for EPS and PVC ablation and possible loop recorder post procedure    NPO after midnight Sun for Mon EP study/ ablation--at [facility] mon am transfer

## 2017-12-02 NOTE — DISCHARGE NOTE ADULT - CARE PROVIDER_API CALL
Stone Davison), Cardiac Electrophysiology; Cardiovascular Disease; Internal Medicine; Nuclear Cardiology  [address]  [address]  [place] [address]  Phone: [phone]  Fax: [phone]

## 2017-12-02 NOTE — PROVIDER CONTACT NOTE (OTHER) - ACTION/TREATMENT ORDERED:
Patient stable, continue to monitor, no interventions at this time. ZOLL cardiac monitor at bedside with atropine and pacing pads at patients bedside.

## 2017-12-02 NOTE — PROGRESS NOTE ADULT - SUBJECTIVE AND OBJECTIVE BOX
Subjective:   	 no anginal chest pain no near syncope intermittent palpations more noted when sitting up     MEDICATIONS:  MEDICATIONS  (STANDING):  heparin  Injectable 5000 Unit(s) SubCutaneous every 12 hours    MEDICATIONS  (PRN):      LABS:	 	    CARDIAC MARKERS:                                13.3   5.42  )-----------( 292      ( 02 Dec 2017 06:30 )             41.2     12-02    142  |  104  |  22  ----------------------------<  95  4.4   |  24  |  0.67    Ca    8.8      02 Dec 2017 06:30  Mg     2.0     12-02      COAGS:       proBNP:   Lipid Profile:   HgA1c:   TSH:       PHYSICAL EXAM:  T(C): 36.6 (12-02-17 @ 05:23), Max: 36.8 (12-01-17 @ 22:05)  HR: 68 (12-02-17 @ 05:23) (68 - 74)  BP: 111/54 (12-02-17 @ 05:23) (101/50 - 111/54)  RR: 17 (12-02-17 @ 05:23) (17 - 17)  SpO2: 100% (12-02-17 @ 05:23) (100% - 100%)  Wt(kg): --  I&O's Summary        	    Cardiovascular: Normal S1 S2,     No JVD, 1/6 CIRILO murmur, Peripheral pulses palpable 2+ bilaterally  Respiratory: Lungs clear to auscultation, normal effort 	  Gastrointestinal:  Soft, Non-tender, + BS	  Extremities no edema, cyanosis, clubbing B/L LE's       TELEMETRY: 	NSR with frequent PVCs    ECG:   frequent monomorphic PVCs as follows (LBBB, inferior axis, transition at V3, negative in aVL, positive in I) 	  RADIOLOGY:   CXR   normal chest       DIAGNOSTIC TESTING:  [ ] Echocardiogram:   EF 65%   nl lvs fx   [ ]  Catheterization:  [ ] Stress Test:    OTHER: 	      ASSESSMENT/PLAN: 	47y Female  PMH HTN who has been experiencing palpitations for 2 years. Outpatient echo and NST have been normal. 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 Medical therapy has been limited by significant sinus bradycardia. She denies high risk features such as angina or syncope.    -given the above It was discussed the R/B/A of medical therapy vs. PVC ablation. She was cited a 30-40% efficacy of medical therapy, including the side effects of worsening her bradycardia but a near 0% medical risk. She was cited a 75-80% efficacy with PVC ablation, along with a 3% risk of bleeding or infection and 1% risk of major complication including but not limited to heart attack, stroke, death, or need for emergency open heart surgery or pericardiocentesis. Understanding her R/B/A she elects PVC ablation given that medical therapy is limited by underlying sinus bradycardia  - HCG negative   -NPO after midnight Sunday night  -will be transferred to [place] Monday 6 AM (please prepare d/c paperwork)  -PVC ablation Monday 8 AM  -d/w Dr Frank

## 2017-12-02 NOTE — PROGRESS NOTE ADULT - SUBJECTIVE AND OBJECTIVE BOX
S/  Pt with frequent palpitations  No cp or SOB or dizziness    Tele NSR freq PVCs    O/  111/54  68  17  100%  97.9  Gen: mid aged female NAD  Neck: no JVP  Lungs: clear; no rales or wheezing  CVS: s1s2 irregular no gallops or murmurs  Abd: soft NT  Ext: no edema  CNS: aao x 3 non-focal    echo EF 65% mild pulm HTN

## 2017-12-02 NOTE — PROVIDER CONTACT NOTE (OTHER) - ASSESSMENT
Patient stable, patient states "I can feel palpitations when my heart rate goes too low" Vital signs: Blood pressure 105/62, heart rate 70 on cardiac monitor, 18 respiratory rate, spo2 100% room air.

## 2017-12-02 NOTE — PROGRESS NOTE ADULT - SUBJECTIVE AND OBJECTIVE BOX
Patient is a 47y old  Female who presents with a chief complaint of "my heart has been pounding" (02 Dec 2017 14:49)      SUBJECTIVE / OVERNIGHT EVENTS:  Patient denies any complains  Cardiac monitor - NSR with frequent PVC's  episode of prolonged pause but asymptomatic  Denies CP/SOB/Palpitation/HA.    MEDICATIONS  (STANDING):  heparin  Injectable 5000 Unit(s) SubCutaneous every 12 hours    MEDICATIONS  (PRN):      PHYSICAL EXAM:  GENERAL: NAD, well-developed, ambulatory   HEAD:  Atraumatic, Normocephalic  EYES: EOMI, PERRLA, conjunctiva and sclera clear  NECK: Supple, No JVD  CHEST/LUNG: Clear to auscultation bilaterally; No wheezes  HEART: Regular rate and rhythm; No murmurs, rubs, or gallops  ABDOMEN: Soft, Nontender, Nondistended; Bowel sounds present  EXTREMITIES:  2+ Peripheral Pulses, No clubbing, cyanosis, or edema  PSYCH: AAOx3  NEUROLOGY: non-focal no gross deficits  SKIN: No rashes or lesions    LABS:                        13.3   5.42  )-----------( 292      ( 02 Dec 2017 06:30 )             41.2     12-02    142  |  104  |  22  ----------------------------<  95  4.4   |  24  |  0.67    Ca    8.8      02 Dec 2017 06:30  Mg     2.0     12-02      RADIOLOGY & ADDITIONAL TESTS:    Imaging Personally Reviewed: yes    Consultant(s) Notes Reviewed:  yes    Care Discussed with Consultants/Other Providers: yes

## 2017-12-02 NOTE — PROVIDER CONTACT NOTE (OTHER) - ACTION/TREATMENT ORDERED:
No interventions at this time continue to monitor. ZOLL cardiac monitor at bedside, atropine and pacing pads at bedside.

## 2017-12-02 NOTE — DISCHARGE NOTE ADULT - CARE PLAN
Principal Discharge DX:	Arrhythmia  Goal:	tansferred to [facility] for PVC ablation  Instructions for follow-up, activity and diet:	activity - as tolerated and with assistance   NPO on call to [facility]  Secondary Diagnosis:	HTN (hypertension)

## 2017-12-02 NOTE — PROVIDER CONTACT NOTE (OTHER) - RECOMMENDATIONS
Vital signs: blood pressure 106/68, heartrate 70, spo2 100% room air, respiratory rate 18. temperature 98.0 orally.

## 2017-12-02 NOTE — DISCHARGE NOTE ADULT - HOSPITAL COURSE
48 y/o female with a PMHx of HTN and GERD presents to ED with dizziness and palpitations, found to have PVCs on tele.    + Palpitations with PVCs on tele    11/30 Echo: EF 65%, normal LVSF, mild pulm HTN  12/1/17 - Tele - SBrady w frequent PVCs,  @ 36 bpm w pause 2.13 secs followed by 2.6 secs - VSS     12/1/17 > Tele - NSR w PVCs, 2.3 sec pause   12/1/17 > Bradycardia HR 36 bpm w 2.6 sec pause - asymp VSS - Cardiac monitor @ bedside w pacing pads on pt & Atropine at bedside   EKG: NSR at 71 bpm with PVCs  CE x2: Negative  CXR: Normal chest  Echo: EF 65%.   1. Normal mitral valve. Minimal mitral regurgitation.  2. Normal left ventricular internal dimensions and wall thicknesses.  3. Endocardium not well visualized; grossly normal left ventricular systolic function.  4. Normal right ventricular size and function.  5. Estimated right ventricular systolic pressure equals 46 mm Hg, assuming right atrial pressure equals 10 mm Hg,consistent with mild pulmonary hypertension.  HCG-<5    Patient is hemodynamically stable and without complaints and patient is ready for discharge to [facility] for PVC ablation procedure 46 y/o female with a PMHx of HTN and GERD presents to ED with dizziness and palpitations, found to have PVCs on tele.    + Palpitations with PVCs on tele    12/1/17 - Tele - SBrady w frequent PVCs,  @ 36 bpm w pause 2.13 secs followed by 2.6 secs - VSS     12/1/17 > Tele - NSR w PVCs, 2.3 sec pause   12/1/17 > Bradycardia HR 36 bpm w 2.6 sec pause - asymp VSS - Cardiac monitor @ bedside w pacing pads on pt & Atropine at bedside   EKG: NSR at 71 bpm with PVCs  CE x2: Negative  CXR: Normal chest  Echo: EF 65%.   1. Normal mitral valve. Minimal mitral regurgitation.  2. Normal left ventricular internal dimensions and wall thicknesses.  3. Endocardium not well visualized; grossly normal left ventricular systolic function.  4. Normal right ventricular size and function.  5. Estimated right ventricular systolic pressure equals 46 mm Hg, assuming right atrial pressure equals 10 mm Hg,consistent with mild pulmonary hypertension.  HCG-<5    Patient is hemodynamically stable and without complaints and patient is ready for discharge to [facility] for PVC ablation procedure

## 2017-12-03 NOTE — PROGRESS NOTE ADULT - ATTENDING COMMENTS
EP ATTENDING    Patient seen and examined. Agree with above. Patient and her  elect PVC ablation. NPO after midnight, transfer to [place] EP lab at 6 AM, PVC ablation tomorrow.
- patient clinically stable  awaiting Cardiac evaluation  continue current medical management  awaiting EPS evaluation  GI DVT prophylaxis

## 2017-12-03 NOTE — PROGRESS NOTE ADULT - SUBJECTIVE AND OBJECTIVE BOX
Subjective:   	 no anginal chest pain no near syncope intermittent palpations      MEDICATIONS  (STANDING):  heparin  Injectable 5000 Unit(s) SubCutaneous every 12 hours    MEDICATIONS  (PRN):      LABS:                        13.3   5.42  )-----------( 292      ( 02 Dec 2017 06:30 )             41.2     Hemoglobin: 13.3 g/dL (12-02 @ 06:30)  Hemoglobin: 13.0 g/dL (12-01 @ 06:09)  Hemoglobin: 12.6 g/dL (11-30 @ 06:00)  Hemoglobin: 12.8 g/dL (11-29 @ 07:58)    12-02    142  |  104  |  22  ----------------------------<  95  4.4   |  24  |  0.67    Ca    8.8      02 Dec 2017 06:30  Mg     2.0     12-02      Creatinine Trend: 0.67<--, 0.64<--, 0.70<--, 0.52<--         PHYSICAL EXAM  Vital Signs Last 24 Hrs  T(C): 36.6 (03 Dec 2017 09:11), Max: 36.7 (02 Dec 2017 15:08)  T(F): 97.8 (03 Dec 2017 09:11), Max: 98 (02 Dec 2017 15:08)  HR: 46 (03 Dec 2017 09:11) (33 - 70)  BP: 108/45 (03 Dec 2017 09:11) (98/48 - 108/45)  BP(mean): --  RR: 19 (03 Dec 2017 09:11) (17 - 19)  SpO2: 100% (03 Dec 2017 09:11) (99% - 100%)    Cardiovascular: Normal S1 S2,     No JVD, 1/6 CIRILO murmur, Peripheral pulses palpable 2+ bilaterally  Respiratory: Lungs clear to auscultation, normal effort 	  Gastrointestinal:  Soft, Non-tender, + BS	  Extremities no edema, cyanosis, clubbing B/L LE's       TELEMETRY: 	NSR with frequent PVCs    ECG:   frequent monomorphic PVCs as follows (LBBB, inferior axis, transition at V3, negative in aVL, positive in I) 	  RADIOLOGY:   CXR   normal chest       DIAGNOSTIC TESTING:  [ ] Echocardiogram:   EF 65%   nl lvs fx   [ ]  Catheterization:  [ ] Stress Test:    OTHER: 	      ASSESSMENT/PLAN: 	47y Female  PMH HTN who has been experiencing palpitations for 2 years. Outpatient echo and NST have been normal. 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 Medical therapy has been limited by significant sinus bradycardia. She denies high risk features such as angina or syncope.    -given the above It was discussed the R/B/A of medical therapy vs. PVC ablation. She was cited a 30-40% efficacy of medical therapy, including the side effects of worsening her bradycardia but a near 0% medical risk. She was cited a 75-80% efficacy with PVC ablation, along with a 3% risk of bleeding or infection and 1% risk of major complication including but not limited to heart attack, stroke, death, or need for emergency open heart surgery or pericardiocentesis. Understanding her R/B/A she elects PVC ablation given that medical therapy is limited by underlying sinus bradycardia  - HCG negative   -NPO after midnight  tonight--patient aware  -will be transferred to [place] Monday 6 AM (please prepare d/c paperwork)-d/w tele PA   -PVC ablation Monday 8 AM  -d/w Dr Frank

## 2017-12-03 NOTE — PROGRESS NOTE ADULT - SUBJECTIVE AND OBJECTIVE BOX
Patient is a 47y old  Female who presents with a chief complaint of "my heart has been pounding" (02 Dec 2017 14:49)      SUBJECTIVE / OVERNIGHT EVENTS:   Patient denies any complains  Episode of bradycardia early AM   no complains at that time   Cardiac monitor - NSR with PVC's    MEDICATIONS  (STANDING):  heparin  Injectable 5000 Unit(s) SubCutaneous every 12 hours    MEDICATIONS  (PRN):      PHYSICAL EXAM:  GENERAL: NAD, well-developed ambulatory  HEAD:  Atraumatic, Normocephalic  EYES: EOMI, PERRLA, conjunctiva and sclera clear  NECK: Supple, No JVD  CHEST/LUNG: Clear to auscultation bilaterally; No wheezes  HEART: Regular rate and rhythm; No murmurs, rubs, or gallops  ABDOMEN: Soft, Nontender, Nondistended; Bowel sounds present  EXTREMITIES:  2+ Peripheral Pulses, No clubbing, cyanosis, or edema  PSYCH: AAOx3 apprehensive but affect appropriate  NEUROLOGY: non-focal  SKIN: No rashes or lesions    LABS:                        13.3   5.42  )-----------( 292      ( 02 Dec 2017 06:30 )             41.2     12-02    142  |  104  |  22  ----------------------------<  95  4.4   |  24  |  0.67    Ca    8.8      02 Dec 2017 06:30  Mg     2.0     12-02        RADIOLOGY & ADDITIONAL TESTS:    Imaging Personally Reviewed: yes    Consultant(s) Notes Reviewed:  yes and agreed with plan    Care Discussed with Consultants/Other Providers: yes

## 2017-12-03 NOTE — PROGRESS NOTE ADULT - ASSESSMENT
· Assessment		  46yo F w/ PMH of HTN p/w palpitations and lightheadedness x 2d being admitted to telemetry for arrhythmia.    Patient clinically stable  cardiac monitor - frequent PVC's  episode of bradycardia early AM  asymptomatic at that time  ambulates with no difficulty  Cardiology and EPS on the case  plan : ablation on Monday       Problem/Plan - 1:  ·  Problem: Arrhythmia. Bradycardia Premature Beats prolonged pauses  continue cardiac monitoring  NPO tonight for transfer to [facility] in the morning for ablation  ECHO reviewed - EF normal  Cardiology on the case  EPS on the case     Problem/Plan - 2:  ·  Problem: HTN (hypertension).    Plan: DASH diet.   off medications ; continue to monitor      Attending Attestation:   continue close cardiac monitoring  continue current medical management  For transfer to [facility] for ablation in the morning  discussed plan in detail with patient and family  GI DVT prophylaxis.     I was physically present for the key portions of the evaluation and management (E/M) service provided.  I agree with the above history, physical, and plan which I have reviewed and edited where appropriate.     45 minutes spent on total encounter; more than 50% of the visit was spent counseling and/or coordinating care by the attending physician.     Plan discussed with family and patient and consultants

## 2017-12-04 ENCOUNTER — TRANSCRIPTION ENCOUNTER (OUTPATIENT)
Age: 47
End: 2017-12-04

## 2017-12-04 ENCOUNTER — INPATIENT (INPATIENT)
Facility: HOSPITAL | Age: 47
LOS: 0 days | Discharge: HOME | DRG: 274 | End: 2017-12-05
Attending: INTERNAL MEDICINE | Admitting: INTERNAL MEDICINE
Payer: COMMERCIAL

## 2017-12-04 VITALS
SYSTOLIC BLOOD PRESSURE: 116 MMHG | OXYGEN SATURATION: 100 % | TEMPERATURE: 98 F | HEART RATE: 64 BPM | DIASTOLIC BLOOD PRESSURE: 64 MMHG | RESPIRATION RATE: 17 BRPM

## 2017-12-04 VITALS
RESPIRATION RATE: 16 BRPM | HEART RATE: 67 BPM | WEIGHT: 126.99 LBS | DIASTOLIC BLOOD PRESSURE: 61 MMHG | OXYGEN SATURATION: 100 % | HEIGHT: 60 IN | TEMPERATURE: 98 F | SYSTOLIC BLOOD PRESSURE: 124 MMHG

## 2017-12-04 DIAGNOSIS — I49.3 VENTRICULAR PREMATURE DEPOLARIZATION: ICD-10-CM

## 2017-12-04 DIAGNOSIS — I44.2 ATRIOVENTRICULAR BLOCK, COMPLETE: ICD-10-CM

## 2017-12-04 PROCEDURE — 93306 TTE W/DOPPLER COMPLETE: CPT | Mod: 26

## 2017-12-04 PROCEDURE — 93010 ELECTROCARDIOGRAM REPORT: CPT

## 2017-12-04 RX ORDER — HEPARIN SODIUM 5000 [USP'U]/ML
1 INJECTION INTRAVENOUS; SUBCUTANEOUS
Qty: 0 | Refills: 0 | COMMUNITY

## 2017-12-04 RX ORDER — SODIUM CHLORIDE 9 MG/ML
1000 INJECTION INTRAMUSCULAR; INTRAVENOUS; SUBCUTANEOUS
Qty: 0 | Refills: 0 | Status: DISCONTINUED | OUTPATIENT
Start: 2017-12-04 | End: 2017-12-05

## 2017-12-04 RX ORDER — SODIUM CHLORIDE 9 MG/ML
3 INJECTION INTRAMUSCULAR; INTRAVENOUS; SUBCUTANEOUS EVERY 8 HOURS
Qty: 0 | Refills: 0 | Status: DISCONTINUED | OUTPATIENT
Start: 2017-12-04 | End: 2017-12-05

## 2017-12-04 RX ADMIN — SODIUM CHLORIDE 3 MILLILITER(S): 9 INJECTION INTRAMUSCULAR; INTRAVENOUS; SUBCUTANEOUS at 21:13

## 2017-12-04 RX ADMIN — SODIUM CHLORIDE 200 MILLILITER(S): 9 INJECTION INTRAMUSCULAR; INTRAVENOUS; SUBCUTANEOUS at 14:54

## 2017-12-04 RX ADMIN — SODIUM CHLORIDE 3 MILLILITER(S): 9 INJECTION INTRAMUSCULAR; INTRAVENOUS; SUBCUTANEOUS at 13:37

## 2017-12-04 NOTE — CHART NOTE - NSCHARTNOTEFT_GEN_A_CORE
episodic     Patient s/p PVC ablation    Post procedure hypotensive 87/50 when arrived from PACU. Asymptomatic- no complaints    O/E   A+O x 4  Pulm CTA noemi  CV SR RRR no M/G/R  abd soft, non tender +BS  right groin site benign, no hematoma, no bleeding +DP    Plan  D/W Dr Davison  IV Fluid bolus given with good result  Stat ECHO done -no pericardial effusion    /50, HR 75, RR 16 at this time

## 2017-12-04 NOTE — PROGRESS NOTE ADULT - SUBJECTIVE AND OBJECTIVE BOX
Patient is a 47y old  Female who presents with a chief complaint of "my heart has been pounding" (02 Dec 2017 14:49)      SUBJECTIVE / OVERNIGHT EVENTS:  NPO awaiting transfer to [facility] for ablation  Denies CP/SOB/Palpitation/HA.    MEDICATIONS  (STANDING):  heparin  Injectable 5000 Unit(s) SubCutaneous every 12 hours    MEDICATIONS  (PRN):        CAPILLARY BLOOD GLUCOSE        I&O's Summary      PHYSICAL EXAM:  GENERAL: NAD, well-developed  HEAD:  Atraumatic, Normocephalic  EYES: EOMI, PERRLA, conjunctiva and sclera clear  NECK: Supple, No JVD  CHEST/LUNG: Clear to auscultation bilaterally; No wheeze  HEART: Regular rate and rhythm; No murmurs, rubs, or gallops  ABDOMEN: Soft, Nontender, Nondistended; Bowel sounds present  EXTREMITIES:  2+ Peripheral Pulses, No clubbing, cyanosis, or edema  PSYCH: AAOx3  NEUROLOGY: non-focal  SKIN: No rashes or lesions      RADIOLOGY & ADDITIONAL TESTS:    Imaging Personally Reviewed: yes    Consultant(s) Notes Reviewed:  yes    Care Discussed with Consultants/Other Providers: yes

## 2017-12-04 NOTE — PROGRESS NOTE ADULT - SUBJECTIVE AND OBJECTIVE BOX
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 Medical therapy is limited by baseline sinus bradycardia    -keep NPO  -PVC ablation today  -expect d/c home tomorrow  -f/u with me at Dr. Frank's office on Tuesday December 12th at 1 PM

## 2017-12-04 NOTE — H&P CARDIOLOGY - PMH
Gastroesophageal reflux disease without esophagitis    HTN (hypertension)    PVC's (premature ventricular contractions)

## 2017-12-04 NOTE — DISCHARGE NOTE ADULT - PLAN OF CARE
Your heartbeat will be controlled. Your catheter/groin site will be free of infection and severe bleeding. Appointments: please call [phone] for follow-up appointment with your electrophysiology (EP) cardiologist within 2-3 weeks after discharge.   Groin Site Care: You can take shower in 24 hours. Keep the area clean and dry. No submersion of your catheter/groin site in bath tubs, pools, or any water for 1 week.   Activity: No driving for 24 hours. No strenuous activity or heavy lifting more than 10 pounds for 1 week.  You may need to: Quit smoking. Limit your alcohol intake.   Call Your Doctor immediately if you have: A fast or irregular heart beat; lightheadedness; severe chest pain or shortness of breath; fever or chills; bleeding, pain, redness, swelling, warmth or drainage at or near the catheter/groin site at [phone]

## 2017-12-04 NOTE — PROGRESS NOTE ADULT - SUBJECTIVE AND OBJECTIVE BOX
EP ADDENDUM    s/p PVC ablation x 2  no acute complications    Plan  -bed rest x 4 hours  -ekg  -no need for medications  -d/c home tomorrow morning  -f/u with me at Dr. Frank's office on Tuesday December 12th at 1 PM      Stone Davison MD, RS

## 2017-12-04 NOTE — DISCHARGE NOTE ADULT - CARE PROVIDER_API CALL
Stone Davison), Cardiac Electrophysiology; Cardiovascular Disease; Internal Medicine; Nuclear Cardiology  [address] [facility]  [address]  [place] [address]  Phone: [phone]  Fax: [phone]

## 2017-12-04 NOTE — DISCHARGE NOTE ADULT - PATIENT PORTAL LINK FT
“You can access the FollowHealth Patient Portal, offered by [facility], by registering with the following website: [email or web address]”

## 2017-12-04 NOTE — PROGRESS NOTE ADULT - ASSESSMENT
Assessment		  46yo F w/ PMH of HTN p/w palpitations and lightheadedness x 2d being admitted to telemetry for arrhythmia.    Patient [id number] stable  NPO for transfer to [facility]   for ablation as per EPS  cardiac monitor - frequent PVC's  episode of bradycardia   asymptomatic   ambulates with no difficulty  Cardiology and EPS on the case         Problem/Plan - 1:  ·  Problem: Arrhythmia. Bradycardia Premature Beats prolonged pauses  continue cardiac monitoring  NPO tonight for transfer to [facility] in the morning for ablation  ECHO reviewed - EF normal  Cardiology on the case  EPS on the case     Problem/Plan - 2:  ·  Problem: HTN (hypertension).    Plan: DASH diet.   off medications ; continue to monitor      Attending Attestation:   For ablation today at [facility]  NPO for now  discussed plan in detail with patient and family  GI DVT prophylaxis.     I was physically present for the key portions of the evaluation and management (E/M) service provided.  I agree with the above history, physical, and plan which I have reviewed and edited where appropriate.     40 minutes spent on total encounter; more than 50% of the visit was spent counseling and/or coordinating care by the attending physician.     Plan discussed with family and patient and consultants

## 2017-12-04 NOTE — PATIENT PROFILE ADULT. - MENTAL HEALTH CONDITIONS/SYMPTOMS, PROFILE
PT ACUTE  Treatment Session          Pt seen on 10 nursing unit.                                                Frequency Comments: SATM-F                                                                                                                Admitting complaint: brain mass                                     Precautions  Weight Bearing Status: Weight bearing as tolerated right lower extremity (08/04/17 0751)  Weight Bearing Status Comments: Cam boot at all times  (08/04/17 0751)  Other Precautions: Fall risk (08/03/17 1114)  Precautions Comments: CAM boot (08/03/17 1114)    SUBJECTIVE: Patient's Personal Goal: to get stronger (08/04/17 0751)  Subjective: Pt. agreeable to session.  (08/04/17 0751)  Subjective/Objective Comments: Okay to see pt. Jason RN  (08/04/17 0751)    OBJECTIVE:  Basic Lines: Capped IV (08/04/17 0751)  Safety Measures: Alarms (08/04/17 0751)    RN reported Banda Fall Scale Score: 65    ASSESSMENT:   *  Pt overall mobility is supervision with gait due to pt. Requiring cues at times to stay inside close to the ww for safety. Pt progressed in transfers with going to and from sit to stand. Patient limited today by fatigue. Pt ambulated in aparicio with ww. Pt will benefit from continued PT to work on stairs to allow for maximal mobility. Pt's previous level of independence was I. Patient returned to bed  post session, alarm set, and call light within reach. *       Other Therapeutic Intervention: Increased time spent educating pt on safety with mobility, ambulating with staff, recommendation for therapy prior to return home due to history of falls.  (08/03/17 0593)     EDUCATION:   On this date, the patient was educated on importance of therapy.    The response to education was: Verbalizes understanding    PT Identified Barriers to Discharge: lives alone, impaired mobility, hx of falls     PLAN:   Continue skilled PT, including the following Treatment/Interventions: Functional transfer training  (08/04/17 0751)   Frequency Comments: SATM-F  (08/04/17 0751)    Treatment Plan for Next Session: LE strengthening, balance activities and obstacle negotiation  Additional Plan Considerations: stairs       RECOMMENDATIONS FOR DISCHARGE:  Recommendation for Discharge: PT: Sub-acute nursing home (08/04/17 0751)    PT/OT Mobility Equipment for Discharge: has cane, 2WW, 4WW (08/04/17 0751)  PT/OT ADL Equipment for Discharge: continue to assess (08/04/17 0751)    ICU Mobility Assesment (PERME):       Last 24 hours of Functional Data  Bed Mobility   Bed Mobility  Supine to Sit: Modified Independent (08/04/17 0751)  Sit to Supine: Modified Independent (08/04/17 0751)  Bed Mobility Comments: HOB elevated, increased time to complete (08/03/17 0935)    Transfers  Transfers  Sit to Stand: Modified Independent (08/04/17 0751)  Stand to Sit: Supervision (Supv);Modified Independent (08/04/17 0751)  Stand Pivot Transfers: Supervision (Supv) (08/04/17 0751)  Assistive Device/: 2-wheeled walker (08/04/17 0751)  Transfer Comments 1: cues for hand placement  (08/04/17 0751)      Gait  Gait  Gait Assistance: Supervision (Supv) (08/04/17 0751)  Assistive Device/: 2-wheeled walker (08/04/17 0751)  Ambulation Distance (Feet): 220 Feet (08/04/17 0751)  Pattern: Decreased stance time R (08/04/17 0751)  Ambulation Surface: Tile (08/03/17 0935)  Gait Comments 1: cues for upright posture and with staying in close to ww at times for safety  (08/04/17 0751)  Second Trial: Yes (08/04/17 0751), Gait - Second Trial  Gait Assistance: Supervision (Supv) (08/04/17 0751)  Assistive Device/: 2-wheeled walker (08/04/17 0751)  Ambulation Distance (Feet): 35 Feet (08/04/17 0751)  Pattern: Shuffle (08/04/17 0751)  Ambulation Surface: Tile (08/04/17 0751)  Gait Comments 2: as above  (08/04/17 0751)    Stairs  Stairs Mobility  Number of Stairs: 4 (08/04/17 0751)  Stair Management Assistance: Minimal Assist (Min)  (08/04/17 0751)  Stair Management Technique: Two hands one rail (08/04/17 0751)  Stairs Mobility Comments: minimal  assist at times to steady pt.  (08/04/17 0751)       Neuromuscular Re-education       Balance  Balance  Sitting - Static: Modified Independent (08/04/17 0751)  Sitting - Dynamic: Modified Independent (08/04/17 0751)  Standing - Static: Supervision (Supv) (08/04/17 0751)  Standing - Dynamic (eyes open): Supervision (Supv) (08/04/17 0751)  Balance Comments #1: no gross LOB or buckling noted. Use of 2ww for UE support (08/03/17 0935)    Wheelchair Mobility       Patient's Personal Goal: to get stronger (08/04/17 0751)    Therapy Goals:    Goals  Short Term Goals to Be Reviewed On: 08/07/17 (08/04/17 0751)  Short Term Goals = Discharge Goals: Yes (08/04/17 0751)  Goal Agreement: Patient agrees with goals and treatment plan (08/04/17 0751)  Bed Mobility Discharge Goal: independent from  flat bed  (08/04/17 0751)  Bed Mobility Discharge Goal Progress: Outcome not met, continue to monitor (08/04/17 0751)  Transfer Discharge Goal: mod I  with  ww (08/04/17 0751)  Transfer Discharge Goal Progress: Outcome not met, continue to monitor (08/04/17 0751)  Ambulation Discharge Goal: 75' with  ww and mod I  (08/04/17 0751)  Ambulation Discharge Goal Progress: Outcome not met, continue to monitor (08/04/17 0751)  Stairs Discharge Goal: negotiate 3 steps with  rail and mod I  (08/04/17 0751)  Stairs Discharge Goal Progress: Outcome not met, continue to monitor (08/04/17 0751)        PT Time Spent: 55 minutes (08/04/17 0751)    See PT flowsheet for full details regarding the PT therapy provided.       none

## 2017-12-04 NOTE — DISCHARGE NOTE ADULT - HOSPITAL COURSE
47 yr old Eritrean female with PMH of htn, PVC arrhythmia (on Holter monitor) and GERD presents to [facility]  ED on 11/30 with dizziness and palpitations.  Patient reports history of intermittent palpitations for past 5 years. This episode began a month ago but is more severe -accompanied by dizziness and orthopnea. Denies syncope or CP. On arrival to ED she was  found to have frequent  PVCs on tele and was transferred to [facility] for further management. Pt is now s/p PVC ablation via right groin. Pt tolerated the procedure well. Site benign. Post-procedure discharge instructions discussed and questions addressed. 47 yr old Russian female with PMH of htn, PVC arrhythmia (on Holter monitor) and GERD presents to [facility]  ED on 11/30 with dizziness and palpitations.  Patient reports history of intermittent palpitations for past 5 years. This episode began a month ago but is more severe -accompanied by dizziness and orthopnea. Denies syncope or CP. On arrival to ED she was  found to have frequent  PVCs on tele and was transferred to [facility] for further management. Pt is now s/p PVC ablation via right groin. Pt tolerated the procedure well. Site benign. Post-procedure discharge instructions discussed and questions addressed.      Patient ambulating without complaint of dizziness, WALDEN or CP. Reports feeling some intermittent palps overnight. Monitor SR 60s with PVCs and 2.1 second pause.  Dr Davison notified and reviewed arrhythmia- cleared for discharge to home. Labs stable. VSS. Groin site stable. Patient will follow up on 12/12 w Dr Davison in Dr Frank's office.

## 2017-12-04 NOTE — H&P CARDIOLOGY - HISTORY OF PRESENT ILLNESS
47 yr old Nepalese female with PMH of htn and PVC arrhythmia and  GERD presents to [facility]  ED on 11/30 with dizziness and palpitations  for 1 month. On admission,  found to have frequent  PVCs on tele    12/1/17 - Tele - SBrady w frequent PVCs,  @ 36 bpm w pause 2.13 secs followed by 2.6 secs - VSS     12/1/17 > Tele - NSR w PVCs, 2.3 sec pause   12/1/17 > Bradycardia HR 36 bpm w 2.6 sec pause - asymp VSS - Cardiac monitor @ bedside w pacing pads on pt & Atropine at bedside   EKG: NSR at 71 bpm with PVCs  CE x2: Negative  CXR: Normal chest  Echo: EF 65%.   1. Normal mitral valve. Minimal mitral regurgitation.  2. Normal left ventricular internal dimensions and wall thicknesses.  3. Endocardium not well visualized; grossly normal left ventricular systolic function.  4. Normal right ventricular size and function.  5. Estimated right ventricular systolic pressure equals 46 mm Hg, assuming right atrial pressure equals 10 mm Hg,consistent with mild pulmonary hypertension.  HCG-<5    Patient is hemodynamically stable and without complaints and patient is ready for discharge to [facility] for PVC ablation procedure    brandt presented to [facility] 47 yr old [place]ese female with PMH of htn, PVC arrhythmia (on Holter monitor) and GERD presents to [facility]  ED on 11/30 with dizziness and palpitations.  Patient reports history of intermittent palpitations for past 5 years. This episode began a month ago but is more severe -accompanied by dizziness and orthopnea. Denies syncope or CP. On arrival to ED she was  found to have frequent  PVCs on tele.  12/1/17 - Tele - SBrady w frequent PVCs,  @ 36 bpm w pause 2.13 secs followed by 2.6 secs - VSS   12/1/17 > Tele - NSR w PVCs, 2.3 sec pause   12/1/17 > Bradycardia HR 36 bpm w 2.6 sec pause - asymp VSS - Cardiac monitor @ bedside w pacing pads on pt & Atropine at bedside   EKG: NSR at 71 bpm with PVCs  CE x2: Negative  CXR: Normal chest  Echo: EF 65%.   1. Normal mitral valve. Minimal mitral regurgitation.  2. Normal left ventricular internal dimensions and wall thicknesses.  3. Endocardium not well visualized; grossly normal left ventricular systolic function.  4. Normal right ventricular size and function.  5. Estimated right ventricular systolic pressure equals 46 mm Hg, assuming right atrial pressure equals 10 mm Hg, consistent with mild pulmonary hypertension.  HCG-<5    Patient is hemodynamically stable and without complaints and transferred to [facility] today for PVC ablation with Dr Davison.

## 2017-12-04 NOTE — DISCHARGE NOTE ADULT - CARE PLAN
Principal Discharge DX:	PVC's (premature ventricular contractions)  Goal:	Your heartbeat will be controlled. Your catheter/groin site will be free of infection and severe bleeding.  Instructions for follow-up, activity and diet:	Appointments: please call [phone] for follow-up appointment with your electrophysiology (EP) cardiologist within 2-3 weeks after discharge.   Groin Site Care: You can take shower in 24 hours. Keep the area clean and dry. No submersion of your catheter/groin site in bath tubs, pools, or any water for 1 week.   Activity: No driving for 24 hours. No strenuous activity or heavy lifting more than 10 pounds for 1 week.  You may need to: Quit smoking. Limit your alcohol intake.   Call Your Doctor immediately if you have: A fast or irregular heart beat; lightheadedness; severe chest pain or shortness of breath; fever or chills; bleeding, pain, redness, swelling, warmth or drainage at or near the catheter/groin site at [phone]

## 2017-12-05 VITALS
TEMPERATURE: 98 F | RESPIRATION RATE: 17 BRPM | HEART RATE: 65 BPM | SYSTOLIC BLOOD PRESSURE: 115 MMHG | DIASTOLIC BLOOD PRESSURE: 54 MMHG | OXYGEN SATURATION: 98 %

## 2017-12-05 DIAGNOSIS — I49.3 VENTRICULAR PREMATURE DEPOLARIZATION: ICD-10-CM

## 2017-12-05 LAB
ANION GAP SERPL CALC-SCNC: 11 MMOL/L — SIGNIFICANT CHANGE UP (ref 5–17)
BUN SERPL-MCNC: 12 MG/DL — SIGNIFICANT CHANGE UP (ref 7–23)
CALCIUM SERPL-MCNC: 8.2 MG/DL — LOW (ref 8.4–10.5)
CHLORIDE SERPL-SCNC: 105 MMOL/L — SIGNIFICANT CHANGE UP (ref 96–108)
CO2 SERPL-SCNC: 23 MMOL/L — SIGNIFICANT CHANGE UP (ref 22–31)
CREAT SERPL-MCNC: 0.6 MG/DL — SIGNIFICANT CHANGE UP (ref 0.5–1.3)
GLUCOSE SERPL-MCNC: 106 MG/DL — HIGH (ref 70–99)
HCT VFR BLD CALC: 35.2 % — SIGNIFICANT CHANGE UP (ref 34.5–45)
HCT VFR BLD CALC: 38.9 % — SIGNIFICANT CHANGE UP (ref 34.5–45)
HGB BLD-MCNC: 11.7 G/DL — SIGNIFICANT CHANGE UP (ref 11.5–15.5)
HGB BLD-MCNC: 12.8 G/DL — SIGNIFICANT CHANGE UP (ref 11.5–15.5)
MCHC RBC-ENTMCNC: 29 PG — SIGNIFICANT CHANGE UP (ref 27–34)
MCHC RBC-ENTMCNC: 29.1 PG — SIGNIFICANT CHANGE UP (ref 27–34)
MCHC RBC-ENTMCNC: 32.9 GM/DL — SIGNIFICANT CHANGE UP (ref 32–36)
MCHC RBC-ENTMCNC: 33.1 GM/DL — SIGNIFICANT CHANGE UP (ref 32–36)
MCV RBC AUTO: 87.7 FL — SIGNIFICANT CHANGE UP (ref 80–100)
MCV RBC AUTO: 88.2 FL — SIGNIFICANT CHANGE UP (ref 80–100)
PLATELET # BLD AUTO: 237 K/UL — SIGNIFICANT CHANGE UP (ref 150–400)
PLATELET # BLD AUTO: 260 K/UL — SIGNIFICANT CHANGE UP (ref 150–400)
POTASSIUM SERPL-MCNC: 4 MMOL/L — SIGNIFICANT CHANGE UP (ref 3.5–5.3)
POTASSIUM SERPL-SCNC: 4 MMOL/L — SIGNIFICANT CHANGE UP (ref 3.5–5.3)
RBC # BLD: 4.01 M/UL — SIGNIFICANT CHANGE UP (ref 3.8–5.2)
RBC # BLD: 4.41 M/UL — SIGNIFICANT CHANGE UP (ref 3.8–5.2)
RBC # FLD: 11.3 % — SIGNIFICANT CHANGE UP (ref 10.3–14.5)
RBC # FLD: 11.3 % — SIGNIFICANT CHANGE UP (ref 10.3–14.5)
SODIUM SERPL-SCNC: 139 MMOL/L — SIGNIFICANT CHANGE UP (ref 135–145)
WBC # BLD: 6.9 K/UL — SIGNIFICANT CHANGE UP (ref 3.8–10.5)
WBC # BLD: 8.1 K/UL — SIGNIFICANT CHANGE UP (ref 3.8–10.5)
WBC # FLD AUTO: 6.9 K/UL — SIGNIFICANT CHANGE UP (ref 3.8–10.5)
WBC # FLD AUTO: 8.1 K/UL — SIGNIFICANT CHANGE UP (ref 3.8–10.5)

## 2017-12-05 PROCEDURE — 93306 TTE W/DOPPLER COMPLETE: CPT

## 2017-12-05 PROCEDURE — 93010 ELECTROCARDIOGRAM REPORT: CPT

## 2017-12-05 PROCEDURE — 80048 BASIC METABOLIC PNL TOTAL CA: CPT

## 2017-12-05 PROCEDURE — C1766: CPT

## 2017-12-05 PROCEDURE — 93654 COMPRE EP EVAL TX VT: CPT

## 2017-12-05 PROCEDURE — 85027 COMPLETE CBC AUTOMATED: CPT

## 2017-12-05 PROCEDURE — 93005 ELECTROCARDIOGRAM TRACING: CPT

## 2017-12-05 PROCEDURE — G0378: CPT

## 2017-12-05 PROCEDURE — 93623 PRGRMD STIMJ&PACG IV RX NFS: CPT

## 2017-12-05 PROCEDURE — C1894: CPT

## 2017-12-05 PROCEDURE — C1732: CPT

## 2017-12-05 RX ADMIN — SODIUM CHLORIDE 3 MILLILITER(S): 9 INJECTION INTRAMUSCULAR; INTRAVENOUS; SUBCUTANEOUS at 06:42

## 2017-12-05 NOTE — PROGRESS NOTE ADULT - SUBJECTIVE AND OBJECTIVE BOX
Patient is a 47y old  Female who presents with a chief complaint of PVC arrhythmia (04 Dec 2017 20:33)          Allergies    No Known Allergies    Intolerances        Medications:  sodium chloride 0.9% lock flush 3 milliLiter(s) IV Push every 8 hours  sodium chloride 0.9%. 1000 milliLiter(s) IV Continuous <Continuous>      Vitals:  T(C): 36.4 (12-05-17 @ 05:15), Max: 36.4 (12-05-17 @ 05:15)  HR: 53 (12-05-17 @ 05:15) (53 - 89)  BP: 90/54 (12-05-17 @ 05:15) (89/51 - 103/51)  BP(mean): --  RR: 17 (12-05-17 @ 05:15) (17 - 17)  SpO2: 98% (12-05-17 @ 05:15) (98% - 100%)  Wt(kg): --  Daily     Daily   I&O's Summary    04 Dec 2017 07:01  -  05 Dec 2017 07:00  --------------------------------------------------------  IN: 420 mL / OUT: 0 mL / NET: 420 mL    05 Dec 2017 07:01  -  05 Dec 2017 09:55  --------------------------------------------------------  IN: 240 mL / OUT: 0 mL / NET: 240 mL          Physical Exam:  Appearance: Normal  Eyes: PERRL, EOMI  HENT: Normal oral [place], NC/AT  Cardiovascular: S1S2, RRR, No M/R/G, no JVD, No Lower extremity edema  Procedural Access Site: No hematoma, Non-tender to palpation, 2+ pulse, No bruit, No Ecchymosis  Respiratory: Clear to auscultation bilaterally  Gastrointestinal: Soft, Non tender, Normal Bowel Sounds  Musculoskeletal: No clubbing, No joint deformity   Neurologic: Non-focal  Lymphatic: No lymphadenopathy  Psychiatry: AAOx3, Mood & affect appropriate  Skin: No rashes, No ecchymoses, No cyanosis    12-05    139  |  105  |  12  ----------------------------<  106<H>  4.0   |  23  |  0.60    Ca    8.2<L>      05 Dec 2017 03:49              Lipid panel   Hgb A1c         ECG:    Echo:    Stress Testing:     Cath:    Imaging:    Interpretation of Telemetry: SB 45-60s with PVCs

## 2017-12-05 NOTE — PROGRESS NOTE ADULT - ASSESSMENT
47 yr old Mongolian female with PMH of htn, PVC arrhythmia (on Holter monitor) and GERD presents to [facility]  ED on 11/30 with dizziness and palpitations.  Patient reports history of intermittent palpitations for past 5 years. This episode began a month ago but is more severe -accompanied by dizziness and orthopnea. Denies syncope or CP. On arrival to ED she was  found to have frequent  PVCs on tele.  12/1/17 - Tele - SBrady w frequent PVCs,  @ 36 bpm w pause 2.13 secs followed by 2.6 secs - VSS   12/1/17 > Tele - NSR w PVCs, 2.3 sec pause   12/1/17 > Bradycardia HR 36 bpm w 2.6 sec pause - asymp VSS - Cardiac monitor @ bedside w pacing pads on pt & Atropine at bedside   EKG: NSR at 71 bpm with PVCs  CE x2: Negative  CXR: Normal chest  Echo: EF 65%.   1. Normal mitral valve. Minimal mitral regurgitation.  2. Normal left ventricular internal dimensions and wall thicknesses.  3. Endocardium not well visualized; grossly normal left ventricular systolic function.  4. Normal right ventricular size and function.  5. Estimated right ventricular systolic pressure equals 46 mm Hg, assuming right atrial pressure equals 10 mm Hg, consistent with mild pulmonary hypertension.  HCG-<5    Patient is hemodynamically stable and without complaints and transferred to [facility] today for PVC ablation with Dr Davison.     s/[p PVC ablation 12/4. Tolerated procedure well. Was hypotensive post procedure yesterday - given IV fluid bolus w subsequent increase in BP and ECHO done which ruled out effusion.    ambulating around unit today- no dizziness or WALDEN. No Pain. +palpitations intermittently (less that before)  Right groin stable. H/H stable  EKG and rhythm strips reviewed by Dr Davison -and cleared for discharge to home. F/U 12/12 w Dr Davison

## 2018-09-07 ENCOUNTER — INPATIENT (INPATIENT)
Facility: HOSPITAL | Age: 48
LOS: 3 days | Discharge: HOME | End: 2018-09-11
Attending: INTERNAL MEDICINE | Admitting: INTERNAL MEDICINE
Payer: COMMERCIAL

## 2018-09-07 VITALS
DIASTOLIC BLOOD PRESSURE: 93 MMHG | RESPIRATION RATE: 16 BRPM | SYSTOLIC BLOOD PRESSURE: 146 MMHG | HEART RATE: 66 BPM | OXYGEN SATURATION: 100 % | TEMPERATURE: 98 F

## 2018-09-07 DIAGNOSIS — R00.2 PALPITATIONS: ICD-10-CM

## 2018-09-07 PROBLEM — I49.3 VENTRICULAR PREMATURE DEPOLARIZATION: Chronic | Status: ACTIVE | Noted: 2017-12-04

## 2018-09-07 PROBLEM — I10 ESSENTIAL (PRIMARY) HYPERTENSION: Chronic | Status: ACTIVE | Noted: 2017-11-29

## 2018-09-07 LAB
ALBUMIN SERPL ELPH-MCNC: 4 G/DL — SIGNIFICANT CHANGE UP (ref 3.3–5)
ALP SERPL-CCNC: 62 U/L — SIGNIFICANT CHANGE UP (ref 40–120)
ALT FLD-CCNC: 16 U/L — SIGNIFICANT CHANGE UP (ref 4–33)
APTT BLD: 36.5 SEC — SIGNIFICANT CHANGE UP (ref 27.5–37.4)
AST SERPL-CCNC: 22 U/L — SIGNIFICANT CHANGE UP (ref 4–32)
BASOPHILS # BLD AUTO: 0.04 K/UL — SIGNIFICANT CHANGE UP (ref 0–0.2)
BASOPHILS NFR BLD AUTO: 0.6 % — SIGNIFICANT CHANGE UP (ref 0–2)
BILIRUB SERPL-MCNC: 0.5 MG/DL — SIGNIFICANT CHANGE UP (ref 0.2–1.2)
BUN SERPL-MCNC: 12 MG/DL — SIGNIFICANT CHANGE UP (ref 7–23)
CALCIUM SERPL-MCNC: 9.4 MG/DL — SIGNIFICANT CHANGE UP (ref 8.4–10.5)
CHLORIDE SERPL-SCNC: 99 MMOL/L — SIGNIFICANT CHANGE UP (ref 98–107)
CO2 SERPL-SCNC: 21 MMOL/L — LOW (ref 22–31)
CREAT SERPL-MCNC: 0.63 MG/DL — SIGNIFICANT CHANGE UP (ref 0.5–1.3)
EOSINOPHIL # BLD AUTO: 0.21 K/UL — SIGNIFICANT CHANGE UP (ref 0–0.5)
EOSINOPHIL NFR BLD AUTO: 2.9 % — SIGNIFICANT CHANGE UP (ref 0–6)
GLUCOSE SERPL-MCNC: 99 MG/DL — SIGNIFICANT CHANGE UP (ref 70–99)
HCT VFR BLD CALC: 38.6 % — SIGNIFICANT CHANGE UP (ref 34.5–45)
HGB BLD-MCNC: 12.8 G/DL — SIGNIFICANT CHANGE UP (ref 11.5–15.5)
IMM GRANULOCYTES # BLD AUTO: 0.02 # — SIGNIFICANT CHANGE UP
IMM GRANULOCYTES NFR BLD AUTO: 0.3 % — SIGNIFICANT CHANGE UP (ref 0–1.5)
INR BLD: 1.05 — SIGNIFICANT CHANGE UP (ref 0.88–1.17)
LYMPHOCYTES # BLD AUTO: 2.43 K/UL — SIGNIFICANT CHANGE UP (ref 1–3.3)
LYMPHOCYTES # BLD AUTO: 34.1 % — SIGNIFICANT CHANGE UP (ref 13–44)
MCHC RBC-ENTMCNC: 28.4 PG — SIGNIFICANT CHANGE UP (ref 27–34)
MCHC RBC-ENTMCNC: 33.2 % — SIGNIFICANT CHANGE UP (ref 32–36)
MCV RBC AUTO: 85.8 FL — SIGNIFICANT CHANGE UP (ref 80–100)
MONOCYTES # BLD AUTO: 0.63 K/UL — SIGNIFICANT CHANGE UP (ref 0–0.9)
MONOCYTES NFR BLD AUTO: 8.8 % — SIGNIFICANT CHANGE UP (ref 2–14)
NEUTROPHILS # BLD AUTO: 3.8 K/UL — SIGNIFICANT CHANGE UP (ref 1.8–7.4)
NEUTROPHILS NFR BLD AUTO: 53.3 % — SIGNIFICANT CHANGE UP (ref 43–77)
NRBC # FLD: 0 — SIGNIFICANT CHANGE UP
PLATELET # BLD AUTO: 275 K/UL — SIGNIFICANT CHANGE UP (ref 150–400)
PMV BLD: 10.1 FL — SIGNIFICANT CHANGE UP (ref 7–13)
POTASSIUM SERPL-MCNC: 3.6 MMOL/L — SIGNIFICANT CHANGE UP (ref 3.5–5.3)
POTASSIUM SERPL-SCNC: 3.6 MMOL/L — SIGNIFICANT CHANGE UP (ref 3.5–5.3)
PROT SERPL-MCNC: 7.7 G/DL — SIGNIFICANT CHANGE UP (ref 6–8.3)
PROTHROM AB SERPL-ACNC: 11.7 SEC — SIGNIFICANT CHANGE UP (ref 9.8–13.1)
RBC # BLD: 4.5 M/UL — SIGNIFICANT CHANGE UP (ref 3.8–5.2)
RBC # FLD: 11.6 % — SIGNIFICANT CHANGE UP (ref 10.3–14.5)
SODIUM SERPL-SCNC: 136 MMOL/L — SIGNIFICANT CHANGE UP (ref 135–145)
TROPONIN T, HIGH SENSITIVITY: < 6 NG/L — SIGNIFICANT CHANGE UP (ref ?–14)
WBC # BLD: 7.13 K/UL — SIGNIFICANT CHANGE UP (ref 3.8–10.5)
WBC # FLD AUTO: 7.13 K/UL — SIGNIFICANT CHANGE UP (ref 3.8–10.5)

## 2018-09-07 PROCEDURE — 71046 X-RAY EXAM CHEST 2 VIEWS: CPT | Mod: 26

## 2018-09-07 PROCEDURE — 99233 SBSQ HOSP IP/OBS HIGH 50: CPT

## 2018-09-07 PROCEDURE — 93306 TTE W/DOPPLER COMPLETE: CPT | Mod: 26

## 2018-09-07 RX ORDER — PANTOPRAZOLE SODIUM 20 MG/1
40 TABLET, DELAYED RELEASE ORAL
Qty: 0 | Refills: 0 | Status: DISCONTINUED | OUTPATIENT
Start: 2018-09-07 | End: 2018-09-11

## 2018-09-07 RX ORDER — INFLUENZA VIRUS VACCINE 15; 15; 15; 15 UG/.5ML; UG/.5ML; UG/.5ML; UG/.5ML
0.5 SUSPENSION INTRAMUSCULAR ONCE
Qty: 0 | Refills: 0 | Status: DISCONTINUED | OUTPATIENT
Start: 2018-09-07 | End: 2018-09-11

## 2018-09-07 RX ORDER — HEPARIN SODIUM 5000 [USP'U]/ML
5000 INJECTION INTRAVENOUS; SUBCUTANEOUS EVERY 12 HOURS
Qty: 0 | Refills: 0 | Status: DISCONTINUED | OUTPATIENT
Start: 2018-09-07 | End: 2018-09-11

## 2018-09-07 NOTE — ED ADULT NURSE NOTE - OBJECTIVE STATEMENT
Pt received in exam room 17. alert and oriented x3, ambulatory. presents to ED co palpitations x 1 week, pt states " I just ignored the pain". also co "indigestion" x "few months". hx of ablation in past. denies dizziness, nausea, cp, sob. pt states she decided to go to ED today because she checked her heart rate and it was 40. Heart rate ranges from 39-60 in exam room. Placed on pacer pads. Labs sent. IV placed. VS as stated. Comfort measures provided. Awaiting bedside echo. will continue to monitor.

## 2018-09-07 NOTE — ED PROVIDER NOTE - OBJECTIVE STATEMENT
48F h/o ablation (2/2 frequent PVCs in 2017) presents with one week of intermittent palpitations and heartburn, tried antacids, which didn't help. She denies taking any medications. In ER on monitor she goes into and out of sinus bradycardia (40s). She admits to orthopnea for over a year, no new symptoms. No fever, chills, N/V/D.

## 2018-09-07 NOTE — H&P ADULT - HISTORY OF PRESENT ILLNESS
49 yo F h.o GERD, PVC s/p ablation 2017 with one week of palpitations. + SOB associated with palpitations. Also reported orthopnea ( sleeps on 3 pillows) x 1 year. NO chest pain, SOB at rest, WALDEN, PND, , diaphoresis, lightheadedness, dizziness, syncope, increased lower extremity edema, fever chills, malaise, myalgias, anorexia, weight changes ( loss or gain), night sweats, generalized fatigue abdominal pain, N/V/C/D BRBPR, melena, urinary symptoms, cough, and wheezing.

## 2018-09-07 NOTE — ED ADULT NURSE REASSESSMENT NOTE - NS ED NURSE REASSESS COMMENT FT1
Pt seen by telel PA.  HR varies 40s-50s.  PT transferred with tele montior with RN and tech as per Tele PA.  PT is asymptomatic.  Handoff given to LUCIANO Corey.

## 2018-09-07 NOTE — ED ADULT NURSE NOTE - NSIMPLEMENTINTERV_GEN_ALL_ED
Implemented All Universal Safety Interventions:  [place] to call system. Call bell, personal items and telephone within reach. Instruct patient to call for assistance. Room bathroom lighting operational. Non-slip footwear when patient is off stretcher. Physically safe environment: no spills, clutter or unnecessary equipment. Stretcher in lowest position, wheels locked, appropriate side rails in place.

## 2018-09-07 NOTE — H&P ADULT - FAMILY HISTORY
Father  Still living? Unknown  Family history of diabetes mellitus, Age at diagnosis: Age Unknown  Family history of mitral valve repair, Age at diagnosis: Age Unknown

## 2018-09-07 NOTE — H&P ADULT - ADDITIONAL PE
EKG: SR @ 63 bpm +RBBB Flipped Ts V1-V3 III, AVF  Tele rate regular @ 35-50 bpm  NO other arrythmias

## 2018-09-07 NOTE — ED ADULT TRIAGE NOTE - CHIEF COMPLAINT QUOTE
intermittent palpitations x past wk.   indigestion feeling to ch intermittent x mos. states today noted pulse 40.   c/o diff breathing since yesterday  pmh- oblation,a fib  on no meds

## 2018-09-07 NOTE — H&P ADULT - ATTENDING COMMENTS
pt seen and examined  patient is a 48 year old woman with history of ablation (2/2 frequent PVCs in 2017) off of avn medications now presenting with one week of intermittent palpitations and chest discomfort she believes is secondary to GERD.     Patient denies any dyspnea, cough, syncope, edema, exertional symptoms, nausea, abdominal pain, fever, chills,  or rash.  Denies any history of CAD, MI, valve disease, cardiomyopathy, or congenital heart disease.  in er, pt with sinus cindi and evidence of sa node exit block   hd stable  eps evaluated pt and pt being admitted for monitoring and poss ppm    pt in the past has seen dr. lucero/ozzy kearney and dr memo farah but now is requesting a new cardiology provider    vitals stable  nad aao x3 nc/at neck supple no jvd  cv s1s2 rrr lungs clear b/l  abd soft, nt  ext no edema    A/P    PVC, s/p ablation  bradycardia, exit block   chest pain     1. Exit block , bradycardia  symptomatic cindi  on no avn med at home  no recent otc meds  check labs, tsh  echo nl with nl lv and valve fxn  eps f/u   cont to monitor for ppm need  retrieve old ablation report for type of ablation/location    2. Chest pain, atypical   could be secondary to gerd vs symptomatic cindi  ecg wirg rbbb no acute ischemic changes  echo with nl lv fxn  yesenia  asa  ppi    d/w eps  dvt ppx   ER on monitor she goes into and out of sinus bradycardia (40s). She admits to orthopnea for over a year, no new symptoms. No fever, chills, N/V/D.

## 2018-09-07 NOTE — CONSULT NOTE ADULT - SUBJECTIVE AND OBJECTIVE BOX
CHIEF COMPLAINT: Asked by ED attending to evaluate patient with symptomatic bradycardia   HISTORY OF PRESENT ILLNESS:  Ms. Woods is a pleasant 48 y/o female presents in with complaints of palpitations associated with shortness of breath, WALDEN and dizziness for the past 2 weeks.     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 She initially attributed this to indigestion and has been taking antacids with no relief. ho has been experiencing palpitations for 2 years.     Denies taking any meds. The only change in her life style is- starting Von exercise class that worsened her symptoms. Additionally, reports doing her house hold chores has become a great burden ( doing laundry, going to her basement etc ).     EKG shows sinus bradycardia ~40's with sinus exit block. An echocardiogram performed today showed normal LV function. Minimal MR, mild TR.   Called to evaluate.    	  PAST MEDICAL & SURGICAL HISTORY:  PVC's (premature ventricular contractions)  HTN (hypertension)  Gastroesophageal reflux disease without esophagitis  No significant past surgical history    PREVIOUS DIAGNOSTIC TESTING:      < from: Transthoracic Echocardiogram (09.07.18 @ 12:01) >  CONCLUSIONS:  1. Mildly thickened mitral valve. Minimal mitral  regurgitation.  2. Normal left ventricular internal dimensions and wall  thicknesses.  3. Endocardium not well visualized; grossly normal left  ventricular systolic function.  4. The right ventricle is not well visualized; grossly  normal right ventricular systolic function.  5. Normal tricuspid valve. Mild tricuspid regurgitation.  6. Estimated pulmonary artery systolic pressure equals 37  mm Hg, assuming right atrial pressure equals 10  mm Hg,  consistent with borderline pulmonary hypertension.  *** Compared with echocardiogram of 11/30/2017, no  significant changes noted.  ------------------------------------------------------------------------    < end of copied text >  [ ]  Catheterization:  [ ] Stress Test:  	    MEDICATIONS:  heparin  Injectable 5000 Unit(s) SubCutaneous every 12 hours  pantoprazole    Tablet 40 milliGRAM(s) Oral before breakfast    FAMILY HISTORY:  Family history of mitral valve repair (Father)  Family history of diabetes mellitus (Father)    SOCIAL HISTORY:    [x ] Non-smoker  [ ] Smoker  [ ] Alcohol    Allergies  No Known Allergies    Intolerances:   REVIEW OF SYSTEMS:  CONSTITUTIONAL: No fever, weight loss, or fatigue  EYES: No eye pain, visual disturbances, or discharge  ENMT:  No difficulty hearing, tinnitus, vertigo; No sinus or throat pain  NECK: No pain or stiffness  RESPIRATORY: No cough, wheezing, chills or hemoptysis; No Shortness of Breath  CARDIOVASCULAR: No chest pain, + SOB and WALDEN, +palpitations, passing out, +dizziness, or leg swelling  GASTROINTESTINAL: No abdominal or epigastric pain. No nausea, vomiting, or hematemesis; No diarrhea or constipation. No melena or hematochezia.  GENITOURINARY: No dysuria, frequency, hematuria, or incontinence  NEUROLOGICAL: No headaches, memory loss, loss of strength, numbness, or tremors  SKIN: No itching, burning, rashes, or lesions   LYMPH Nodes: No enlarged glands  ENDOCRINE: No heat or cold intolerance; No hair loss  MUSCULOSKELETAL: No joint pain or swelling; No muscle, back, or extremity pain  PSYCHIATRIC: No depression, anxiety, mood swings, or difficulty sleeping  HEME/LYMPH: No easy bruising, or bleeding gums  ALLERY AND IMMUNOLOGIC: No hives or eczema	    [ x] All others negative	  [ ] Unable to obtain    PHYSICAL EXAM:  T(C): 36.6 (09-07-18 @ 16:51), Max: 36.9 (09-07-18 @ 10:59)  HR: 59 (09-07-18 @ 16:51) (49 - 67)  BP: 112/64 (09-07-18 @ 16:51) (111/48 - 146/93)  RR: 16 (09-07-18 @ 16:51) (16 - 18)  SpO2: 100% (09-07-18 @ 16:51) (100% - 100%)  Wt(kg): --  I&O's Summary      Appearance: Normal	  HEENT:   Normal oral mucosa, PERRL, EOMI	  Lymphatic: No lymphadenopathy  Cardiovascular: Normal S1 S2, No JVD, No murmurs, No edema  Respiratory: Lungs clear to auscultation	  Psychiatry: A & O x 3, Mood & affect appropriate  Gastrointestinal:  Soft, Non-tender, + BS	  Skin: No rashes, No ecchymoses, No cyanosis	  Neurologic: Non-focal  Extremities: Normal range of motion, No clubbing, cyanosis or edema  Vascular: Peripheral pulses palpable 2+ bilaterally    TELEMETRY: SB 40's with sinus exit block 	                          12.8   7.13  )-----------( 275      ( 07 Sep 2018 11:30 )             38.6     09-07    136  |  99  |  12  ----------------------------<  99  3.6   |  21<L>  |  0.63    Ca    9.4      07 Sep 2018 11:30    TPro  7.7  /  Alb  4.0  /  TBili  0.5  /  DBili  x   /  AST  22  /  ALT  16  /  AlkPhos  62  09-07      ASSESSMENT/PLAN: In summary, 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     EF preserved.    Although it is likely she would benefit from a bradycardia device therapy, would review prior records of the ablation  2) Obtain cardiac MRI   3) continue tele monitoring  4) Keep pacing pads on   5) Maintain K > 4.0 and Mg > 1.8  6) Keep patient NPO for possible PPM Monday.     Thank you.     Elisabet Nix,FNP-C  [id number] CHIEF COMPLAINT: Asked by ED attending to evaluate patient with symptomatic bradycardia   HISTORY OF PRESENT ILLNESS:  Ms. Woods is a pleasant 46 y/o female presents in with complaints of palpitations associated with shortness of breath, WALDEN and dizziness for the past 2 weeks.     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 She initially attributed this to indigestion and has been taking antacids with no relief. ho has been experiencing palpitations for 2 years.     Denies taking any meds. The only change in her life style is- starting Von exercise class that worsened her symptoms. Additionally, reports doing her house hold chores has become a great burden ( doing laundry, going to her basement etc ).     EKG shows sinus bradycardia ~40's with sinus exit block. An echocardiogram performed today showed normal LV function. Minimal MR, mild TR.   Called to evaluate.    	  PAST MEDICAL & SURGICAL HISTORY:  PVC's (premature ventricular contractions)  HTN (hypertension)  Gastroesophageal reflux disease without esophagitis  No significant past surgical history    PREVIOUS DIAGNOSTIC TESTING:      < from: Transthoracic Echocardiogram (09.07.18 @ 12:01) >  CONCLUSIONS:  1. Mildly thickened mitral valve. Minimal mitral  regurgitation.  2. Normal left ventricular internal dimensions and wall  thicknesses.  3. Endocardium not well visualized; grossly normal left  ventricular systolic function.  4. The right ventricle is not well visualized; grossly  normal right ventricular systolic function.  5. Normal tricuspid valve. Mild tricuspid regurgitation.  6. Estimated pulmonary artery systolic pressure equals 37  mm Hg, assuming right atrial pressure equals 10  mm Hg,  consistent with borderline pulmonary hypertension.  *** Compared with echocardiogram of 11/30/2017, no  significant changes noted.  ------------------------------------------------------------------------    < end of copied text >  [ ]  Catheterization:  [ ] Stress Test:  	    MEDICATIONS:  heparin  Injectable 5000 Unit(s) SubCutaneous every 12 hours  pantoprazole    Tablet 40 milliGRAM(s) Oral before breakfast    FAMILY HISTORY:  Family history of mitral valve repair (Father)  Family history of diabetes mellitus (Father)    SOCIAL HISTORY:    [x ] Non-smoker  [ ] Smoker  [ ] Alcohol    Allergies  No Known Allergies    Intolerances:   REVIEW OF SYSTEMS:  CONSTITUTIONAL: No fever, weight loss, or fatigue  EYES: No eye pain, visual disturbances, or discharge  ENMT:  No difficulty hearing, tinnitus, vertigo; No sinus or throat pain  NECK: No pain or stiffness  RESPIRATORY: No cough, wheezing, chills or hemoptysis; No Shortness of Breath  CARDIOVASCULAR: No chest pain, + SOB and WALDEN, +palpitations, passing out, +dizziness, or leg swelling  GASTROINTESTINAL: No abdominal or epigastric pain. No nausea, vomiting, or hematemesis; No diarrhea or constipation. No melena or hematochezia.  GENITOURINARY: No dysuria, frequency, hematuria, or incontinence  NEUROLOGICAL: No headaches, memory loss, loss of strength, numbness, or tremors  SKIN: No itching, burning, rashes, or lesions   LYMPH Nodes: No enlarged glands  ENDOCRINE: No heat or cold intolerance; No hair loss  MUSCULOSKELETAL: No joint pain or swelling; No muscle, back, or extremity pain  PSYCHIATRIC: No depression, anxiety, mood swings, or difficulty sleeping  HEME/LYMPH: No easy bruising, or bleeding gums  ALLERY AND IMMUNOLOGIC: No hives or eczema	    [ x] All others negative	  [ ] Unable to obtain    PHYSICAL EXAM:  T(C): 36.6 (09-07-18 @ 16:51), Max: 36.9 (09-07-18 @ 10:59)  HR: 59 (09-07-18 @ 16:51) (49 - 67)  BP: 112/64 (09-07-18 @ 16:51) (111/48 - 146/93)  RR: 16 (09-07-18 @ 16:51) (16 - 18)  SpO2: 100% (09-07-18 @ 16:51) (100% - 100%)  Wt(kg): --  I&O's Summary      Appearance: Normal	  HEENT:   Normal oral mucosa, PERRL, EOMI	  Lymphatic: No lymphadenopathy  Cardiovascular: Normal S1 S2, No JVD, No murmurs, No edema  Respiratory: Lungs clear to auscultation	  Psychiatry: A & O x 3, Mood & affect appropriate  Gastrointestinal:  Soft, Non-tender, + BS	  Skin: No rashes, No ecchymoses, No cyanosis	  Neurologic: Non-focal  Extremities: Normal range of motion, No clubbing, cyanosis or edema  Vascular: Peripheral pulses palpable 2+ bilaterally    TELEMETRY: SB 40's with sinus exit block 	                          12.8   7.13  )-----------( 275      ( 07 Sep 2018 11:30 )             38.6     09-07    136  |  99  |  12  ----------------------------<  99  3.6   |  21<L>  |  0.63    Ca    9.4      07 Sep 2018 11:30    TPro  7.7  /  Alb  4.0  /  TBili  0.5  /  DBili  x   /  AST  22  /  ALT  16  /  AlkPhos  62  09-07      ASSESSMENT/PLAN: In summary, 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     EF preserved.    Although it is likely she would benefit from a bradycardia device therapy, would review prior records of the ablation  2) Obtain cardiac MRI   3) continue tele monitoring  4) Keep pacing pads on   5) Maintain K > 4.0 and Mg > 1.8  6) Keep patient NPO for possible PPM Monday pending results of MRI .     Thank you.     Elisabet Nix,[facility]-C  [id number]

## 2018-09-07 NOTE — ED PROVIDER NOTE - MEDICAL DECISION MAKING DETAILS
48F p/w intermittent bradycardia/palpitations. Willg et EKG< basic labs, trop. EP consult. Reinoso: 48F p/w intermittent bradycardia/palpitations. Will get EKG, basic labs, trop. EP consult.

## 2018-09-07 NOTE — H&P ADULT - PROBLEM SELECTOR PLAN 1
due to symptomatic bradycardia  TTE ordered  EP consult appreciated  for Cardiac MRI  Will eventually need PPM due to symptomatic bradycardia  TTE ordered  EP consult appreciated  serum HCG negative in flowsheets  for Cardiac MRI  Will eventually need PPM  case dw EP and Dr Hammond

## 2018-09-08 LAB
BASOPHILS # BLD AUTO: 0.04 K/UL — SIGNIFICANT CHANGE UP (ref 0–0.2)
BASOPHILS NFR BLD AUTO: 0.8 % — SIGNIFICANT CHANGE UP (ref 0–2)
BUN SERPL-MCNC: 11 MG/DL — SIGNIFICANT CHANGE UP (ref 7–23)
CALCIUM SERPL-MCNC: 9 MG/DL — SIGNIFICANT CHANGE UP (ref 8.4–10.5)
CHLORIDE SERPL-SCNC: 103 MMOL/L — SIGNIFICANT CHANGE UP (ref 98–107)
CO2 SERPL-SCNC: 24 MMOL/L — SIGNIFICANT CHANGE UP (ref 22–31)
CREAT SERPL-MCNC: 0.57 MG/DL — SIGNIFICANT CHANGE UP (ref 0.5–1.3)
EOSINOPHIL # BLD AUTO: 0.2 K/UL — SIGNIFICANT CHANGE UP (ref 0–0.5)
EOSINOPHIL NFR BLD AUTO: 4 % — SIGNIFICANT CHANGE UP (ref 0–6)
GLUCOSE SERPL-MCNC: 90 MG/DL — SIGNIFICANT CHANGE UP (ref 70–99)
HCT VFR BLD CALC: 37.8 % — SIGNIFICANT CHANGE UP (ref 34.5–45)
HGB BLD-MCNC: 12.5 G/DL — SIGNIFICANT CHANGE UP (ref 11.5–15.5)
IMM GRANULOCYTES # BLD AUTO: 0.01 # — SIGNIFICANT CHANGE UP
IMM GRANULOCYTES NFR BLD AUTO: 0.2 % — SIGNIFICANT CHANGE UP (ref 0–1.5)
LYMPHOCYTES # BLD AUTO: 1.82 K/UL — SIGNIFICANT CHANGE UP (ref 1–3.3)
LYMPHOCYTES # BLD AUTO: 36.3 % — SIGNIFICANT CHANGE UP (ref 13–44)
MAGNESIUM SERPL-MCNC: 1.7 MG/DL — SIGNIFICANT CHANGE UP (ref 1.6–2.6)
MCHC RBC-ENTMCNC: 28.3 PG — SIGNIFICANT CHANGE UP (ref 27–34)
MCHC RBC-ENTMCNC: 33.1 % — SIGNIFICANT CHANGE UP (ref 32–36)
MCV RBC AUTO: 85.5 FL — SIGNIFICANT CHANGE UP (ref 80–100)
MONOCYTES # BLD AUTO: 0.49 K/UL — SIGNIFICANT CHANGE UP (ref 0–0.9)
MONOCYTES NFR BLD AUTO: 9.8 % — SIGNIFICANT CHANGE UP (ref 2–14)
NEUTROPHILS # BLD AUTO: 2.46 K/UL — SIGNIFICANT CHANGE UP (ref 1.8–7.4)
NEUTROPHILS NFR BLD AUTO: 48.9 % — SIGNIFICANT CHANGE UP (ref 43–77)
NRBC # FLD: 0 — SIGNIFICANT CHANGE UP
PHOSPHATE SERPL-MCNC: 3.8 MG/DL — SIGNIFICANT CHANGE UP (ref 2.5–4.5)
PLATELET # BLD AUTO: 243 K/UL — SIGNIFICANT CHANGE UP (ref 150–400)
PMV BLD: 10.1 FL — SIGNIFICANT CHANGE UP (ref 7–13)
POTASSIUM SERPL-MCNC: 4 MMOL/L — SIGNIFICANT CHANGE UP (ref 3.5–5.3)
POTASSIUM SERPL-SCNC: 4 MMOL/L — SIGNIFICANT CHANGE UP (ref 3.5–5.3)
RBC # BLD: 4.42 M/UL — SIGNIFICANT CHANGE UP (ref 3.8–5.2)
RBC # FLD: 11.9 % — SIGNIFICANT CHANGE UP (ref 10.3–14.5)
SODIUM SERPL-SCNC: 139 MMOL/L — SIGNIFICANT CHANGE UP (ref 135–145)
TSH SERPL-MCNC: 0.71 UIU/ML — SIGNIFICANT CHANGE UP (ref 0.27–4.2)
WBC # BLD: 5.02 K/UL — SIGNIFICANT CHANGE UP (ref 3.8–10.5)
WBC # FLD AUTO: 5.02 K/UL — SIGNIFICANT CHANGE UP (ref 3.8–10.5)

## 2018-09-08 RX ORDER — SODIUM CHLORIDE 9 MG/ML
500 INJECTION INTRAMUSCULAR; INTRAVENOUS; SUBCUTANEOUS
Qty: 0 | Refills: 0 | Status: COMPLETED | OUTPATIENT
Start: 2018-09-08 | End: 2018-09-08

## 2018-09-08 RX ADMIN — SODIUM CHLORIDE 50 MILLILITER(S): 9 INJECTION INTRAMUSCULAR; INTRAVENOUS; SUBCUTANEOUS at 16:58

## 2018-09-08 RX ADMIN — PANTOPRAZOLE SODIUM 40 MILLIGRAM(S): 20 TABLET, DELAYED RELEASE ORAL at 06:45

## 2018-09-08 RX ADMIN — HEPARIN SODIUM 5000 UNIT(S): 5000 INJECTION INTRAVENOUS; SUBCUTANEOUS at 06:45

## 2018-09-08 NOTE — PROGRESS NOTE ADULT - ASSESSMENT
49 yo F h.o GERD, PVC s/p ablation 2017 with one week of palpitation    1. Exit block , bradycardia  symptomatic cindi, on no avn med at home, no recent otc meds  echo nl with nl lv and valve fxn  eps eval noted, plan for cardiac MRI, possible PPM    retrieve old ablation report for type of ablation/location  c.o dizziness, check orthostatics     2. Chest pain, atypical   could be secondary to gerd vs symptomatic cindi  ecg with  rbbb no acute ischemic changes  echo with nl lv fxn  asa  ppi

## 2018-09-08 NOTE — PROGRESS NOTE ADULT - ATTENDING COMMENTS
Patient seen and examined.  Agree with above NP note.  cv stable  chest pain resolved  still with continued dizziness  tele with sbady, sa block, no pauses > 3 seconds  cont to monitor   await cardiac mri to r/o infiltrative cmp  echo nl  eps f/u for poss ppm monday if still cindi with symptoms  check orthostatics

## 2018-09-09 LAB
BUN SERPL-MCNC: 11 MG/DL — SIGNIFICANT CHANGE UP (ref 7–23)
CALCIUM SERPL-MCNC: 8.5 MG/DL — SIGNIFICANT CHANGE UP (ref 8.4–10.5)
CHLORIDE SERPL-SCNC: 106 MMOL/L — SIGNIFICANT CHANGE UP (ref 98–107)
CO2 SERPL-SCNC: 23 MMOL/L — SIGNIFICANT CHANGE UP (ref 22–31)
CREAT SERPL-MCNC: 0.52 MG/DL — SIGNIFICANT CHANGE UP (ref 0.5–1.3)
GLUCOSE SERPL-MCNC: 84 MG/DL — SIGNIFICANT CHANGE UP (ref 70–99)
HCT VFR BLD CALC: 40.9 % — SIGNIFICANT CHANGE UP (ref 34.5–45)
HGB BLD-MCNC: 13.2 G/DL — SIGNIFICANT CHANGE UP (ref 11.5–15.5)
MAGNESIUM SERPL-MCNC: 1.8 MG/DL — SIGNIFICANT CHANGE UP (ref 1.6–2.6)
MCHC RBC-ENTMCNC: 27.6 PG — SIGNIFICANT CHANGE UP (ref 27–34)
MCHC RBC-ENTMCNC: 32.3 % — SIGNIFICANT CHANGE UP (ref 32–36)
MCV RBC AUTO: 85.6 FL — SIGNIFICANT CHANGE UP (ref 80–100)
NRBC # FLD: 0 — SIGNIFICANT CHANGE UP
PLATELET # BLD AUTO: 238 K/UL — SIGNIFICANT CHANGE UP (ref 150–400)
PMV BLD: 10 FL — SIGNIFICANT CHANGE UP (ref 7–13)
POTASSIUM SERPL-MCNC: 3.7 MMOL/L — SIGNIFICANT CHANGE UP (ref 3.5–5.3)
POTASSIUM SERPL-SCNC: 3.7 MMOL/L — SIGNIFICANT CHANGE UP (ref 3.5–5.3)
RBC # BLD: 4.78 M/UL — SIGNIFICANT CHANGE UP (ref 3.8–5.2)
RBC # FLD: 11.9 % — SIGNIFICANT CHANGE UP (ref 10.3–14.5)
SODIUM SERPL-SCNC: 140 MMOL/L — SIGNIFICANT CHANGE UP (ref 135–145)
WBC # BLD: 4.96 K/UL — SIGNIFICANT CHANGE UP (ref 3.8–10.5)
WBC # FLD AUTO: 4.96 K/UL — SIGNIFICANT CHANGE UP (ref 3.8–10.5)

## 2018-09-09 RX ORDER — POTASSIUM CHLORIDE 20 MEQ
40 PACKET (EA) ORAL ONCE
Qty: 0 | Refills: 0 | Status: COMPLETED | OUTPATIENT
Start: 2018-09-09 | End: 2018-09-09

## 2018-09-09 RX ADMIN — PANTOPRAZOLE SODIUM 40 MILLIGRAM(S): 20 TABLET, DELAYED RELEASE ORAL at 06:09

## 2018-09-09 RX ADMIN — Medication 40 MILLIEQUIVALENT(S): at 11:25

## 2018-09-09 RX ADMIN — HEPARIN SODIUM 5000 UNIT(S): 5000 INJECTION INTRAVENOUS; SUBCUTANEOUS at 18:08

## 2018-09-09 NOTE — PROGRESS NOTE ADULT - ASSESSMENT
49 yo F h.o GERD, PVC s/p ablation 2017 with one week of palpitations    1. Exit block , bradycardia  symptomatic cindi, on no avn med at home, no recent otc meds  echo nl with nl lv and valve fxn  eps f.u  await cardiac MRI for possible infiltrative cmp   possible PPM pending mri     2. Chest pain, atypical, resolved   could be secondary to gerd vs symptomatic cindi  ecg with  rbbb no acute ischemic changes  echo with nl lv fxn  asa  ppi    dvt ppx

## 2018-09-09 NOTE — PROGRESS NOTE ADULT - ASSESSMENT
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     1) EF is preserved. Although it is likely she would benefit from a bradycardia device therapy, would review prior records of the ablation  2) Obtain cardiac MRI, plan for tomorrow  3) continue tele monitoring  4) Keep pacing pads on   5) Maintain K > 4.0 and Mg > 1.8  6) Keep patient NPO tonight at [place] for possible PPM Monday pending results of MRI .     Eh Simeon  Cardiology Fellow

## 2018-09-10 LAB
BUN SERPL-MCNC: 9 MG/DL — SIGNIFICANT CHANGE UP (ref 7–23)
CALCIUM SERPL-MCNC: 9 MG/DL — SIGNIFICANT CHANGE UP (ref 8.4–10.5)
CHLORIDE SERPL-SCNC: 102 MMOL/L — SIGNIFICANT CHANGE UP (ref 98–107)
CO2 SERPL-SCNC: 26 MMOL/L — SIGNIFICANT CHANGE UP (ref 22–31)
CREAT SERPL-MCNC: 0.57 MG/DL — SIGNIFICANT CHANGE UP (ref 0.5–1.3)
GLUCOSE SERPL-MCNC: 87 MG/DL — SIGNIFICANT CHANGE UP (ref 70–99)
HCT VFR BLD CALC: 42.1 % — SIGNIFICANT CHANGE UP (ref 34.5–45)
HGB BLD-MCNC: 13.7 G/DL — SIGNIFICANT CHANGE UP (ref 11.5–15.5)
MAGNESIUM SERPL-MCNC: 2 MG/DL — SIGNIFICANT CHANGE UP (ref 1.6–2.6)
MCHC RBC-ENTMCNC: 28.4 PG — SIGNIFICANT CHANGE UP (ref 27–34)
MCHC RBC-ENTMCNC: 32.5 % — SIGNIFICANT CHANGE UP (ref 32–36)
MCV RBC AUTO: 87.2 FL — SIGNIFICANT CHANGE UP (ref 80–100)
NRBC # FLD: 0 — SIGNIFICANT CHANGE UP
PLATELET # BLD AUTO: 272 K/UL — SIGNIFICANT CHANGE UP (ref 150–400)
PMV BLD: 10 FL — SIGNIFICANT CHANGE UP (ref 7–13)
POTASSIUM SERPL-MCNC: 4.1 MMOL/L — SIGNIFICANT CHANGE UP (ref 3.5–5.3)
POTASSIUM SERPL-SCNC: 4.1 MMOL/L — SIGNIFICANT CHANGE UP (ref 3.5–5.3)
RBC # BLD: 4.83 M/UL — SIGNIFICANT CHANGE UP (ref 3.8–5.2)
RBC # FLD: 11.9 % — SIGNIFICANT CHANGE UP (ref 10.3–14.5)
SODIUM SERPL-SCNC: 140 MMOL/L — SIGNIFICANT CHANGE UP (ref 135–145)
WBC # BLD: 4.57 K/UL — SIGNIFICANT CHANGE UP (ref 3.8–10.5)
WBC # FLD AUTO: 4.57 K/UL — SIGNIFICANT CHANGE UP (ref 3.8–10.5)

## 2018-09-10 PROCEDURE — 75557 CARDIAC MRI FOR MORPH: CPT | Mod: 26

## 2018-09-10 PROCEDURE — 99233 SBSQ HOSP IP/OBS HIGH 50: CPT

## 2018-09-10 RX ADMIN — PANTOPRAZOLE SODIUM 40 MILLIGRAM(S): 20 TABLET, DELAYED RELEASE ORAL at 06:06

## 2018-09-10 NOTE — PROGRESS NOTE ADULT - ASSESSMENT
49 yo F h.o GERD, PVC s/p ablation 2017 with one week of palpitations    1. Exit block , bradycardia  symptomatic cindi, on no avn med at home, no recent otc meds  echo nl with nl lv and valve fxn  await cardiac MRI for possible infiltrative cmp   possible PPM pending mri   EPS f/u     2. Chest pain, atypical, resolved   could be secondary to gerd vs symptomatic cindi  ecg with  rbbb no acute ischemic changes  echo with nl lv fxn  continue asa, ppi     dvt ppx

## 2018-09-10 NOTE — PROGRESS NOTE ADULT - ATTENDING COMMENTS
Agree with above NP note.  still with cindi/block  still with dizzines but less  await mri results to r/o infiltr cmp  d/w eps  there is concern for possible coronary etiology in setting of recent ablation near rca  pending mri will consider cath dionisio

## 2018-09-11 ENCOUNTER — TRANSCRIPTION ENCOUNTER (OUTPATIENT)
Age: 48
End: 2018-09-11

## 2018-09-11 VITALS
HEART RATE: 74 BPM | DIASTOLIC BLOOD PRESSURE: 90 MMHG | OXYGEN SATURATION: 97 % | SYSTOLIC BLOOD PRESSURE: 136 MMHG | RESPIRATION RATE: 18 BRPM | TEMPERATURE: 98 F

## 2018-09-11 LAB — HCG UR QL: NEGATIVE — SIGNIFICANT CHANGE UP

## 2018-09-11 PROCEDURE — 99233 SBSQ HOSP IP/OBS HIGH 50: CPT

## 2018-09-11 RX ORDER — PANTOPRAZOLE SODIUM 20 MG/1
1 TABLET, DELAYED RELEASE ORAL
Qty: 30 | Refills: 0 | OUTPATIENT
Start: 2018-09-11 | End: 2018-10-10

## 2018-09-11 RX ORDER — SODIUM CHLORIDE 9 MG/ML
500 INJECTION INTRAMUSCULAR; INTRAVENOUS; SUBCUTANEOUS
Qty: 0 | Refills: 0 | Status: DISCONTINUED | OUTPATIENT
Start: 2018-09-11 | End: 2018-09-11

## 2018-09-11 RX ORDER — ASPIRIN/CALCIUM CARB/MAGNESIUM 324 MG
1 TABLET ORAL
Qty: 0 | Refills: 0 | COMMUNITY

## 2018-09-11 RX ADMIN — SODIUM CHLORIDE 75 MILLILITER(S): 9 INJECTION INTRAMUSCULAR; INTRAVENOUS; SUBCUTANEOUS at 12:01

## 2018-09-11 RX ADMIN — PANTOPRAZOLE SODIUM 40 MILLIGRAM(S): 20 TABLET, DELAYED RELEASE ORAL at 06:21

## 2018-09-11 NOTE — PROGRESS NOTE ADULT - ASSESSMENT
48 yo F with preserved LVSF a PMH of RVOT ablation who presented with a two week history of SOB/WALDEN, dizziness, chest pain, and profound weakness; found to be symptomatic sinus bradycardia with sinus exit block with heat rates in the 40's.  Cardiac MRI without infiltrative process.  There is a concern of RCA involvement post ablation:      - keep NPO for likely coronary angiogram with Dr. VINCENT Hammond  - further mgmt per primary team  - discussed with patient and EP attending

## 2018-09-11 NOTE — DISCHARGE NOTE ADULT - PATIENT PORTAL LINK FT
You can access the [facility] Patient Portal, offered by [facility], by registering with the following website: [email or web address] [place]

## 2018-09-11 NOTE — DISCHARGE NOTE ADULT - MEDICATION SUMMARY - MEDICATIONS TO TAKE
I will START or STAY ON the medications listed below when I get home from the hospital:    aspirin 81 mg oral tablet  -- 1 tab(s) by mouth once a day  -- Indication: For cardioprtective    pantoprazole 40 mg oral delayed release tablet  -- 1 tab(s) by mouth once a day (before a meal)  -- Indication: For Acid reflux

## 2018-09-11 NOTE — DISCHARGE NOTE ADULT - PLAN OF CARE
Prevent complications You are currently refusing pacemaker. Please follow up with Dr. Rocha and Dr. Hammond at your scheduled appointments rule out acute coronary syndrome Echocardiogram unremarkable, cardiac angio with normal arteries.

## 2018-09-11 NOTE — PROGRESS NOTE ADULT - REASON FOR ADMISSION
"Palpitations x 1 week"

## 2018-09-11 NOTE — PROGRESS NOTE ADULT - SUBJECTIVE AND OBJECTIVE BOX
INTERVAL HISTORY:  Patient seen and examined. Appears comfortable. Earl any CP, SOB, dizziness, LH, palps or near syncope or syncope.   Underwent Cardiac MRI   	  MEDICATIONS:  heparin  Injectable 5000 Unit(s) SubCutaneous every 12 hours  pantoprazole    Tablet 40 milliGRAM(s) Oral before breakfast  influenza   Vaccine 0.5 milliLiter(s) IntraMuscular once  PHYSICAL EXAM:  T(C): 36.4 (09-10-18 @ 06:04), Max: 36.7 (09-09-18 @ 14:40)  HR: 55 (09-10-18 @ 06:04) (53 - 56)  BP: 111/54 (09-10-18 @ 06:04) (111/54 - 135/61)  RR: 18 (09-10-18 @ 06:04) (16 - 18)  SpO2: 100% (09-10-18 @ 06:04) (100% - 100%)  Wt(kg): --  I&O's Summary    09 Sep 2018 07:01  -  10 Sep 2018 07:00  --------------------------------------------------------  IN: 600 mL / OUT: 0 mL / NET: 600 mL    Appearance: Normal	  HEENT:   Normal oral mucosa, PERRL, EOMI	  Lymphatic: No lymphadenopathy  Cardiovascular: Normal S1 S2, No JVD, No murmurs, No edema  Respiratory: Lungs clear to auscultation	  Psychiatry: A & O x 3, Mood & affect appropriate  Gastrointestinal:  Soft, Non-tender, + BS	  Skin: No rashes, No ecchymoses, No cyanosis  Neurologic: Non-focal  Extremities: Normal range of motion, No clubbing, cyanosis or edema  Vascular: Peripheral pulses palpable 2+ bilaterally    TELEMETRY: SB 50's - 82 bpm occ 2.3 sec pauses.     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     EF preserved.      2) F/u cardiac MRI results performed  9/10/18  3) continue tele monitoring  4) Keep pacing pads on   5) Maintain K > 4.0 and Mg > 1.8  6) Possible PPM implantation Tuesday 9/11 pending results of MRI.   If deivce therapy is considered NPO after MN.     Thank you.     Elisabet Nix,FNP-C  [id number]
CARDIOLOGY FOLLOW UP - Dr. Hammond    CC no chest pain or sob   c.o dizziness when standing        PHYSICAL EXAM:  T(C): 36.8 (09-08-18 @ 06:41), Max: 36.9 (09-07-18 @ 11:59)  HR: 58 (09-08-18 @ 06:41) (45 - 67)  BP: 101/56 (09-08-18 @ 06:41) (101/56 - 140/57)  RR: 16 (09-08-18 @ 06:41) (16 - 18)  SpO2: 100% (09-08-18 @ 06:41) (98% - 100%)  Wt(kg): --  I&O's Summary    07 Sep 2018 07:01  -  08 Sep 2018 07:00  --------------------------------------------------------  IN: 480 mL / OUT: 0 mL / NET: 480 mL        Appearance: Normal	  Cardiovascular: Normal S1 S2,RRR  Respiratory: Lungs clear to auscultation	  Gastrointestinal:  Soft, Non-tender, + BS	  Extremities: Normal range of motion, No clubbing, cyanosis or edema        MEDICATIONS  (STANDING):  heparin  Injectable 5000 Unit(s) SubCutaneous every 12 hours  influenza   Vaccine 0.5 milliLiter(s) IntraMuscular once  pantoprazole    Tablet 40 milliGRAM(s) Oral before breakfast      TELEMETRY:NSR- SB ( 40s) with freqeunt 2 sec pauses  	    ECG:  	  RADIOLOGY:   DIAGNOSTIC TESTING:  [ ] Echocardiogram:  [ ]  Catheterization:  [ ] Stress Test:    OTHER: 	    LABS:	 	                                12.5   5.02  )-----------( 243      ( 08 Sep 2018 06:45 )             37.8     09-08    139  |  103  |  11  ----------------------------<  90  4.0   |  24  |  0.57    Ca    9.0      08 Sep 2018 06:45  Phos  3.8     09-08  Mg     1.7     09-08    TPro  7.7  /  Alb  4.0  /  TBili  0.5  /  DBili  x   /  AST  22  /  ALT  16  /  AlkPhos  62  09-07    PT/INR - ( 07 Sep 2018 11:30 )   PT: 11.7 SEC;   INR: 1.05          PTT - ( 07 Sep 2018 11:30 )  PTT:36.5 SEC
CARDIOLOGY FOLLOW UP - Dr. Hammond    CC no cp/sob      PHYSICAL EXAM:  T(C): 36.4 (09-10-18 @ 06:04), Max: 36.7 (09-09-18 @ 14:40)  HR: 55 (09-10-18 @ 06:04) (53 - 56)  BP: 111/54 (09-10-18 @ 06:04) (111/54 - 135/61)  RR: 18 (09-10-18 @ 06:04) (16 - 18)  SpO2: 100% (09-10-18 @ 06:04) (100% - 100%)  Wt(kg): --  I&O's Summary    09 Sep 2018 07:01  -  10 Sep 2018 07:00  --------------------------------------------------------  IN: 600 mL / OUT: 0 mL / NET: 600 mL        Appearance: Normal	  Cardiovascular: Normal S1 S2,RRR, No JVD, No murmurs  Respiratory: Lungs clear to auscultation	  Gastrointestinal:  Soft, Non-tender, + BS	  Extremities: Normal range of motion, No clubbing, cyanosis or edema        MEDICATIONS  (STANDING):  heparin  Injectable 5000 Unit(s) SubCutaneous every 12 hours  influenza   Vaccine 0.5 milliLiter(s) IntraMuscular once  pantoprazole    Tablet 40 milliGRAM(s) Oral before breakfast      TELEMETRY: 2 sec pause, NSR,     ECG:  	  RADIOLOGY:   DIAGNOSTIC TESTING:  [ ] Echocardiogram:  [ ]  Catheterization:  [ ] Stress Test:    OTHER: 	    LABS:	 	                                13.7   4.57  )-----------( 272      ( 10 Sep 2018 06:45 )             42.1     09-10    140  |  102  |  9   ----------------------------<  87  4.1   |  26  |  0.57    Ca    9.0      10 Sep 2018 06:45  Mg     2.0     09-10
CARDIOLOGY FOLLOW UP NOTE - DR. GARDNER    Subjective:    still with dizziness    no cp, sob    PHYSICAL EXAM:  T(C): 36.4 (09-09-18 @ 06:03), Max: 36.6 (09-08-18 @ 13:45)  HR: 52 (09-09-18 @ 06:03) (52 - 63)  BP: 107/52 (09-09-18 @ 06:03) (107/52 - 131/82)  RR: 16 (09-09-18 @ 06:03) (16 - 18)  SpO2: 100% (09-09-18 @ 06:03) (100% - 100%)  Wt(kg): --  I&O's Summary    08 Sep 2018 07:01  -  09 Sep 2018 07:00  --------------------------------------------------------  IN: 336 mL / OUT: 0 mL / NET: 336 mL        Appearance: Normal	  Cardiovascular: Normal S1 S2,RRR, No JVD, No murmurs  Respiratory: Lungs clear to auscultation	  Gastrointestinal:  Soft, Non-tender, + BS	  Extremities: Normal range of motion, No clubbing, cyanosis or edema  Vascular: Peripheral pulses palpable 2+ bilaterally    MEDICATIONS  (STANDING):  heparin  Injectable 5000 Unit(s) SubCutaneous every 12 hours  influenza   Vaccine 0.5 milliLiter(s) IntraMuscular once  pantoprazole    Tablet 40 milliGRAM(s) Oral before breakfast  potassium chloride    Tablet ER 40 milliEquivalent(s) Oral once      TELEMETRY: 	    ECG:  	  RADIOLOGY:   DIAGNOSTIC TESTING:  [ ] Echocardiogram:  [ ] Catheterization:  [ ] Stress Test:    OTHER: 	    LABS:	 	    CARDIAC MARKERS:                                13.2   4.96  )-----------( 238      ( 09 Sep 2018 06:18 )             40.9     09-09    140  |  106  |  11  ----------------------------<  84  3.7   |  23  |  0.52    Ca    8.5      09 Sep 2018 06:18  Phos  3.8     09-08  Mg     1.8     09-09    TPro  7.7  /  Alb  4.0  /  TBili  0.5  /  DBili  x   /  AST  22  /  ALT  16  /  AlkPhos  62  09-07    proBNP:   PT/INR - ( 07 Sep 2018 11:30 )   PT: 11.7 SEC;   INR: 1.05          PTT - ( 07 Sep 2018 11:30 )  PTT:36.5 SEC  Lipid Profile:   HgA1c:
Interval Events:    Complains of mild heartburn, but no issues with chest pain, dizziness, or passing out.     ROS: Denies HA/dizziness/CP/SOB/palpitations/LE edema/abd pain    MEDICATIONS:  heparin  Injectable 5000 Unit(s) SubCutaneous every 12 hours  pantoprazole    Tablet 40 milliGRAM(s) Oral before breakfast  influenza   Vaccine 0.5 milliLiter(s) IntraMuscular once      PHYSICAL EXAM:  T(C): 36.4 (09-09-18 @ 06:03), Max: 36.6 (09-08-18 @ 13:45)  HR: 52 (09-09-18 @ 06:03) (52 - 63)  BP: 107/52 (09-09-18 @ 06:03) (107/52 - 131/82)  RR: 16 (09-09-18 @ 06:03) (16 - 18)  SpO2: 100% (09-09-18 @ 06:03) (100% - 100%)  Wt(kg): --  I&O's Summary    08 Sep 2018 07:01  -  09 Sep 2018 07:00  --------------------------------------------------------  IN: 336 mL / OUT: 0 mL / NET: 336 mL        Appearance: No Acute Distress  Cardiovascular: Normal S1 S2, RRR  Respiratory: Clear to auscultation  Gastrointestinal: Soft, Non-tender	  Neurologic: Non-focal  Extremities: No edema  Psychiatry: A & O x 3, Mood & affect appropriate    LABS:	 	    CBC Full  -  ( 09 Sep 2018 06:18 )  WBC Count : 4.96 K/uL  Hemoglobin : 13.2 g/dL  Hematocrit : 40.9 %  Platelet Count - Automated : 238 K/uL  Mean Cell Volume : 85.6 fL  Mean Cell Hemoglobin : 27.6 pg  Mean Cell Hemoglobin Concentration : 32.3 %  Auto Neutrophil # : x  Auto Lymphocyte # : x  Auto Monocyte # : x  Auto Eosinophil # : x  Auto Basophil # : x  Auto Neutrophil % : x  Auto Lymphocyte % : x  Auto Monocyte % : x  Auto Eosinophil % : x  Auto Basophil % : x    09-09    140  |  106  |  11  ----------------------------<  84  3.7   |  23  |  0.52  09-08    139  |  103  |  11  ----------------------------<  90  4.0   |  24  |  0.57    Ca    8.5      09 Sep 2018 06:18  Ca    9.0      08 Sep 2018 06:45  Phos  3.8     09-08  Mg     1.8     09-09  Mg     1.7     09-08    TPro  7.7  /  Alb  4.0  /  TBili  0.5  /  DBili  x   /  AST  22  /  ALT  16  /  AlkPhos  62  09-07      TELEMETRY: 2 second pause, NSR
Patient seen and evaluated today.  No significant events overnight.  Feels generally well yet c/o occasional palpitations overnight.  Telemetry review demonstrates SR/SB with HR: 77 (09-11-18 @ 06:27) (45 - 77) and periods of SEB with up to 3.0 second pauses irrespective of sleep.    MEDICATIONS:  heparin  Injectable 5000 Unit(s) SubCutaneous every 12 hours  influenza   Vaccine 0.5 milliLiter(s) IntraMuscular once  pantoprazole    Tablet 40 milliGRAM(s) Oral before breakfast    REVIEW OF SYSTEMS:  CONSTITUTIONAL: No fever, weight loss, or fatigue  NECK: No pain or stiffness  RESPIRATORY: No cough, wheezing, chills or hemoptysis; No Shortness of Breath  CARDIOVASCULAR: No chest pain, palpitations, passing out, dizziness, or leg swelling  GASTROINTESTINAL: No abdominal or epigastric pain. No nausea, vomiting, or hematemesis; No diarrhea or constipation. No melena or hematochezia.  NEUROLOGICAL: No headaches, memory loss, loss of strength, numbness, or tremors  SKIN: No itching, burning, rashes, or lesions   PSYCHIATRIC: No depression, anxiety, mood swings, or difficulty sleeping  HEME/LYMPH: No easy bruising, or bleeding gums  ALLERGY AND IMMUNOLOGIC: No hives or eczema	  All others negative	    PHYSICAL EXAM:  T(C): 36.5 (09-11-18 @ 06:27), Max: 36.5 (09-11-18 @ 06:27)  HR: 77 (09-11-18 @ 06:27) (45 - 77)  BP: 118/70 (09-11-18 @ 06:27) (118/70 - 140/76)  RR: 18 (09-11-18 @ 06:27) (18 - 18)  SpO2: 95% (09-11-18 @ 06:27) (95% - 100%)  Wt(kg): --  I&O's Summary     Appearance: Normal	  HEENT:   Normal oral mucosa, PERRL, EOMI	  Lymphatic: No lymphadenopathy  Cardiovascular: Normal S1 S2, bradycardic, No JVD, No murmurs, No edema  Respiratory: Lungs clear to auscultation	  Psychiatry: A & O x 3, Mood & affect appropriate  Gastrointestinal:  Soft, Non-tender, + BS	  Skin: No rashes, No ecchymoses, No cyanosis	  Neurologic: Non-focal  Extremities: Normal range of motion, No clubbing, cyanosis or edema  Vascular: Peripheral pulses palpable 2+ bilaterally    DIAGNOSTICS:  TELEMETRY: as above	    LABS:	 	                          13.7   4.57  )-----------( 272      ( 10 Sep 2018 06:45 )             42.1     09-10    140  |  102  |  9   ----------------------------<  87  4.1   |  26  |  0.57    Ca    9.0      10 Sep 2018 06:45  Mg     2.0     09-10      proBNP:

## 2018-09-11 NOTE — DISCHARGE NOTE ADULT - ADDITIONAL INSTRUCTIONS
Dr. Josefina Huitron. 9/27/18 @ 1:30pm. Dr. Josefina Huitron. 9/27/18 @ 1:30pm.  Dr. Hammond on 7/27 @ 2pm

## 2018-09-11 NOTE — DISCHARGE NOTE ADULT - CARE PROVIDER_API CALL
Carlo Rocha), Cardiac Electrophysiology; Cardiology; Internal Medicine  [id number] [place] [address]  Phone: [phone]  Fax: [phone]    Jatin Hammond), Cardiovascular Disease; Internal Medicine; Interventional Cardiology; Nuclear Cardiology  [address] [facility] [address]  [place] [address]  Phone: [phone]  Fax: [phone]

## 2018-09-27 ENCOUNTER — APPOINTMENT (OUTPATIENT)
Dept: ELECTROPHYSIOLOGY | Facility: CLINIC | Age: 48
End: 2018-09-27

## 2019-04-01 ENCOUNTER — RESULT REVIEW (OUTPATIENT)
Age: 49
End: 2019-04-01

## 2020-04-21 NOTE — DISCHARGE NOTE ADULT - VISION (WITH CORRECTIVE LENSES IF THE PATIENT USUALLY WEARS THEM):
Problem: Falls - Risk of:  Goal: Will remain free from falls  Description: Will remain free from falls  4/21/2020 0315 by Danielle Fox RN  Outcome: Ongoing  Note: Patient is a fall risk. Patient is a contact assist w/ gait belt and brace. Patient had a fall last night put a note in. See Fall Risk assessment for details. Bed is in low, lock position; call light/belongings within reach. No attempts to get out of bed have been made, calls appropriately when assistance is needed. Bed alarm and hourly rounds in place for safety. Will continue to monitor and reassess throughout shift.         Problem: Falls - Risk of:  Goal: Absence of physical injury  Description: Absence of physical injury  Outcome: Ongoing Normal vision: sees adequately in most situations; can see medication labels, newsprint
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
## 2021-06-05 NOTE — PATIENT PROFILE ADULT. - MEDICATION HERBAL REMEDIES, PROFILE
Patient: Alicia Penn  Procedure(s):  LAPAROSCOPY, LEFT SALPINGECTOMY WITH ECTOPIC  Anesthesia type: general    Patient location: PACU  Last vitals:   Vitals Value Taken Time   BP 98/48 1/22/2020 10:40 PM   Temp 37.1  C (98.8  F) 1/22/2020 10:20 PM   Pulse 72 1/22/2020 10:45 PM   Resp 16 1/22/2020 10:40 PM   SpO2 97 % 1/22/2020 10:45 PM   Vitals shown include unvalidated device data.  Post vital signs: stable  Level of consciousness: awake and responds to simple questions  Post-anesthesia pain: pain controlled  Post-anesthesia nausea and vomiting: no  Pulmonary: unassisted, return to baseline  Cardiovascular: stable and blood pressure at baseline  Hydration: adequate  Anesthetic events: no    QCDR Measures:  ASA# 11 - Hannah-op Cardiac Arrest: ASA11B - Patient did NOT experience unanticipated cardiac arrest  ASA# 12 - Hannah-op Mortality Rate: ASA12B - Patient did NOT die  ASA# 13 - PACU Re-Intubation Rate: ASA13B - Patient did NOT require a new airway mgmt  ASA# 10 - Composite Anes Safety: ASA10A - No serious adverse event    Additional Notes: Patient recovering nicely in PACU.    no

## 2021-12-08 NOTE — PROGRESS NOTE ADULT - PROVIDER SPECIALTY LIST ADULT
Cardiology Pt s/p serial BP monitoring in OBT with 1 elevated BP of 146/87 at 2130 (all other -30/59-80), preE labs.  with all others WNL (see resident note and lab results). Per resident AISHWARYA Zayas pt elected to stay for IOL for preE w/o SF and plan would be for IOL with cytotec. COVID 19 PCR swab collected and sent, L&D charge ENEDELIA Max updated.     FHR continues cat I, no contractions recorded per TOCO

## 2022-01-01 NOTE — H&P CARDIOLOGY - BP NONINVASIVE MEAN (MM HG)
ICN DAILY PROGRESS NOTE        Subjective :  Taiwo Bailon is an Inborn    2480 g female infant admitted 2022  5:10 AM at Gestational Age: 36w3d now at age: 4 day old and whose birthday is 2023.     Corrected Gestational Age: 37w0d    Objective :  Overnight events: Passed Car seat test, 1 spell yesterday evening requiring tactile stimulation. Lost 5g. TSB 12.4 today morning, PT discontinued.    Vital Last Value 24 Hour Range   Temperature 97.8 °F (36.6 °C) (22) Temp  Min: 97.8 °F (36.6 °C)  Max: 99 °F (37.2 °C)      Pulse 160 (2200) Pulse  Min: 100  Max: 160   Respiratory 34 (22) Resp  Min: 32  Max: 40   Non-Invasive Blood Pressure 80/48 (22 0845) BP  Min: 80/48  Max: 86/55   Arterial Blood Pressure   No data recorded   Mean Arterial Pressure   No data recorded   Pulse Oximetry Sat 100 % (22) SpO2  Min: 66 %  Max: 100 %     Vital Today Admitted   Weight (!) 2250 g (22) Weight: 2480 g (Filed from Delivery Summary) (22)   Length  18.5\" (47 cm) (Filed from Delivery Summary) (22) Length: 18.5\" (47 cm) (Filed from Delivery Summary) (22)   Head Circumference 32.5 cm (12.8\") (Filed from Delivery Summary) (22) Head Circumference: 32.5 cm (12.8\") (Filed from Delivery Summary) (22)     Weight Change Over 24 Hours: Weight change: -5 g   Weight Change Over 7 Days: Weight change: -5 g  Weight Change Since Birth: -9%   Percent Weight Change Since Birth: -9.27 % (22)     Respiratory Settings Last Value   Nasal Cannula Flow      Mode  RA   Rate     Peak Inspiratory Pressure     Tidal Volume     Inspiratory time     Pressure Support     PEEP/CPAC/EPAP     FiO2     Mean     Delta P     Hertz       Last Apnea:  ;    Intervention: Tactile stimulation, moderate (22)    Last Bradycardia: length each event lasted (in sec) over the past  24 hours:  ;    Interventions: Intervention: Tactile stimulation, moderate (22 0204)      Early onset sepsis screen:     Sepsis Risk Calculator Data      Admission (Current) from 2022 in [facility]   Gestational age (weeks) 36 weeks   Gestational age (days) 3 days   Highest maternal antepartum temp (F) 98.8   ROM (hours) 13.8 hours   Maternal GBS status 0   Type of intrapartum antibiotics 1          Risk at Birth: 0.16  Risk - Well Appearin.07  Risk - Equivocal: 0.8  Risk - Clinical Illness: 3.38      Clinical Exam:  Well Appearing (no persistent physiologic abnormalities)    Plan for EOS  - EOS Risk at Birth: Less than 1 per 1,000 live births and well appearing - No culture, No antibiotics, Routine Vitals  - Blood culture and CBC on admission - No  - No Antibiotics was initiated due to low risk of Sepsis.    Lines, Tubes, & Drains        PIV:  - No    NG/OG tube: - No    Fluids  Based off a Dosing Weight of  5 lb 7.5 oz (2480 g)     Intubation  Necessity/Indication: Not Intubated  Readiness for Extubation discussed - N/A 2022    Urinary Catheter  Necessity/Indication: Not Catheterized  Continued need for Urinary Catheter discussed - N/A 2022    Central Line  Type of Central Line: None  Necessity/Indication: No Central line in place  Continued need for Central Line discussed N/A 2022      Last 24H:   Intake/Output  Report       0659  07 0659    P.O. 280 40    Total Intake 280 40    Net +280 +40          Urine Occurrence 7 x 1 x    Stool Occurrence 5 x 1 x           Transcutaneous Bilirubin  TCB Result:    TCB Site:          Labs (Last 24 hours)  Recent Results (from the past 24 hour(s))   Bilirubin, Total    Collection Time: 22  6:23 AM   Result Value Ref Range    Bilirubin, Total 12.4 (H) 2.0 - 6.0 mg/dL        Medications  Current Facility-Administered Medications   Medication   • dextrose (GLUTOSE) 0.4 g/mL (40%) gel  1.25 mL        Physical Exam  Constitutional:       Appearance: Alert, Active, Normal appearance.   female infant   HENT:      Eyes: PERRL, Normal Conjunctiva. Red Reflex Present Bilaterally - deferred     Head: Normocephalic. Anterior fontanelle is soft, open,flat. Caput/Moulding/Cephalhematoma - No     Mouth/Throat: Palate intact     Mouth: Mucous membranes are moist.   Cardiovascular:      Rate and Rhythm: Normal rate and regular rhythm.      Heart sounds: Heart murmur - No     Comments: Normal peripheral pulses  Pulmonary:      Effort: Pulmonary effort is normal. Grunting/Retractions - No     Breath sounds: Normal breath sounds   Abdominal:      General: Abdomen is flat. Bowel sounds are normal. Anus patent     Palpations: Abdomen is soft.   Genitourinary:  GENITALIA: normal female genitalia  Musculoskeletal: Normal range of motion. Clavicles intact. No Sacral dimple. No Hip clicks                              Normal Ortolani's and Normal Hoffman's  Skin: General: Skin is warm,pink           Capillary Refill: Capillary refill takes less than 2 seconds.            Turgor: Normal.   Neurological:      General: No focal deficit present. No facial asymmetry. Gag Reflex normal. Normal position of Uvula.     Mental Status: alert.       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   - Repeat TSB 2/7 am    Infant born at  36 weeks gestation  Baby born at 36+3wga via iVD 2/2 severe pre-eclampsia, mom on magnesium. Baby initially on MBU; admitted to ICN after failed car seat challenge. Passed hypoglycemia protocol.    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 Baby was sucking on pacifier at the time. Pacifier removed and infant removed from the carseat without improvement in sats. Baby required mild stimulation by RN. Baby admitted to N on continuous cardiorespiratory monitors.    : Mag level 4.1  : Mag level 3.8, no further spells, rpt carseat test passed  : 1 spell yesterday evening requiring tactile stimulation.    Plan:  - Continuous cardiorespiratory monitoring  -5 day spell free watch before discharge      Close exposure to COVID-19 virus  Maternal COVID+ 22, then was tested rapid PCR on 22 and positive, asymptomatic, no isolation per infectious control. No further testing on infant at this time.             Screenings & Procedures   Immunizations:   Most Recent Immunizations   Administered Date(s) Administered   • Hep B, adolescent or pediatric 2022      Hearing Test: Pass R, Pass L (brochures given) (22 0943)  [place]:  State Screen- date drawn (most recent results): 22 (22 1300)  Last 3 results: 22 (22 1300)  CCHD Screening:   Screening complete: Done (22 1555)  Right hand reading %: 98 %  Foot reading %: 98 %  CHD: Normal  Car Seat Screen:  Pass (22 0400)     Parents plan to follow-up as an outpatient following discharge home with PCP: Blanca Cunningham [phone]    I have spoken with  the nursing staff, Neonatologist on-call, Dr Mcclure and the healthcare team and reviewed findings and plan of management.    I have reviewed infants current condition and plan of management with Mother and Father at the bedside.    Demetria Lee MD  2022   10:06 AM     82

## 2023-07-18 ENCOUNTER — EMERGENCY (EMERGENCY)
Facility: HOSPITAL | Age: 53
LOS: 1 days | Discharge: HOME | End: 2023-07-18
Attending: EMERGENCY MEDICINE | Admitting: EMERGENCY MEDICINE
Payer: COMMERCIAL

## 2023-07-18 VITALS
TEMPERATURE: 97 F | HEART RATE: 59 BPM | OXYGEN SATURATION: 100 % | SYSTOLIC BLOOD PRESSURE: 127 MMHG | DIASTOLIC BLOOD PRESSURE: 48 MMHG | RESPIRATION RATE: 16 BRPM

## 2023-07-18 VITALS
TEMPERATURE: 98 F | SYSTOLIC BLOOD PRESSURE: 116 MMHG | HEART RATE: 60 BPM | DIASTOLIC BLOOD PRESSURE: 58 MMHG | RESPIRATION RATE: 16 BRPM | OXYGEN SATURATION: 100 %

## 2023-07-18 LAB
ALBUMIN SERPL ELPH-MCNC: 4.4 G/DL — SIGNIFICANT CHANGE UP (ref 3.3–5)
ALP SERPL-CCNC: 82 U/L — SIGNIFICANT CHANGE UP (ref 40–120)
ALT FLD-CCNC: 20 U/L — SIGNIFICANT CHANGE UP (ref 4–33)
ANION GAP SERPL CALC-SCNC: 13 MMOL/L — SIGNIFICANT CHANGE UP (ref 7–14)
APPEARANCE UR: ABNORMAL
APTT BLD: 39.5 SEC — HIGH (ref 27–36.3)
AST SERPL-CCNC: 24 U/L — SIGNIFICANT CHANGE UP (ref 4–32)
BASOPHILS # BLD AUTO: 0.04 K/UL — SIGNIFICANT CHANGE UP (ref 0–0.2)
BASOPHILS NFR BLD AUTO: 0.7 % — SIGNIFICANT CHANGE UP (ref 0–2)
BILIRUB SERPL-MCNC: 0.4 MG/DL — SIGNIFICANT CHANGE UP (ref 0.2–1.2)
BILIRUB UR-MCNC: NEGATIVE — SIGNIFICANT CHANGE UP
BLD GP AB SCN SERPL QL: NEGATIVE — SIGNIFICANT CHANGE UP
BUN SERPL-MCNC: 12 MG/DL — SIGNIFICANT CHANGE UP (ref 7–23)
CALCIUM SERPL-MCNC: 9.3 MG/DL — SIGNIFICANT CHANGE UP (ref 8.4–10.5)
CHLORIDE SERPL-SCNC: 102 MMOL/L — SIGNIFICANT CHANGE UP (ref 98–107)
CO2 SERPL-SCNC: 24 MMOL/L — SIGNIFICANT CHANGE UP (ref 22–31)
COLOR SPEC: YELLOW — SIGNIFICANT CHANGE UP
CREAT SERPL-MCNC: 0.63 MG/DL — SIGNIFICANT CHANGE UP (ref 0.5–1.3)
DIFF PNL FLD: ABNORMAL
EGFR: 106 ML/MIN/1.73M2 — SIGNIFICANT CHANGE UP
EOSINOPHIL # BLD AUTO: 0.26 K/UL — SIGNIFICANT CHANGE UP (ref 0–0.5)
EOSINOPHIL NFR BLD AUTO: 4.5 % — SIGNIFICANT CHANGE UP (ref 0–6)
GLUCOSE SERPL-MCNC: 83 MG/DL — SIGNIFICANT CHANGE UP (ref 70–99)
GLUCOSE UR QL: NEGATIVE MG/DL — SIGNIFICANT CHANGE UP
HCT VFR BLD CALC: 42.8 % — SIGNIFICANT CHANGE UP (ref 34.5–45)
HGB BLD-MCNC: 14.4 G/DL — SIGNIFICANT CHANGE UP (ref 11.5–15.5)
IANC: 3.14 K/UL — SIGNIFICANT CHANGE UP (ref 1.8–7.4)
IMM GRANULOCYTES NFR BLD AUTO: 0.2 % — SIGNIFICANT CHANGE UP (ref 0–0.9)
INR BLD: 1.09 RATIO — SIGNIFICANT CHANGE UP (ref 0.88–1.16)
KETONES UR-MCNC: 40 MG/DL
LEUKOCYTE ESTERASE UR-ACNC: NEGATIVE — SIGNIFICANT CHANGE UP
LIDOCAIN IGE QN: 95 U/L — HIGH (ref 7–60)
LYMPHOCYTES # BLD AUTO: 1.96 K/UL — SIGNIFICANT CHANGE UP (ref 1–3.3)
LYMPHOCYTES # BLD AUTO: 33.6 % — SIGNIFICANT CHANGE UP (ref 13–44)
MCHC RBC-ENTMCNC: 29.4 PG — SIGNIFICANT CHANGE UP (ref 27–34)
MCHC RBC-ENTMCNC: 33.6 GM/DL — SIGNIFICANT CHANGE UP (ref 32–36)
MCV RBC AUTO: 87.5 FL — SIGNIFICANT CHANGE UP (ref 80–100)
MONOCYTES # BLD AUTO: 0.42 K/UL — SIGNIFICANT CHANGE UP (ref 0–0.9)
MONOCYTES NFR BLD AUTO: 7.2 % — SIGNIFICANT CHANGE UP (ref 2–14)
NEUTROPHILS # BLD AUTO: 3.14 K/UL — SIGNIFICANT CHANGE UP (ref 1.8–7.4)
NEUTROPHILS NFR BLD AUTO: 53.8 % — SIGNIFICANT CHANGE UP (ref 43–77)
NITRITE UR-MCNC: NEGATIVE — SIGNIFICANT CHANGE UP
NRBC # BLD: 0 /100 WBCS — SIGNIFICANT CHANGE UP (ref 0–0)
NRBC # FLD: 0 K/UL — SIGNIFICANT CHANGE UP (ref 0–0)
PH UR: 6 — SIGNIFICANT CHANGE UP (ref 5–8)
PLATELET # BLD AUTO: 272 K/UL — SIGNIFICANT CHANGE UP (ref 150–400)
POTASSIUM SERPL-MCNC: 3.9 MMOL/L — SIGNIFICANT CHANGE UP (ref 3.5–5.3)
POTASSIUM SERPL-SCNC: 3.9 MMOL/L — SIGNIFICANT CHANGE UP (ref 3.5–5.3)
PROT SERPL-MCNC: 7.8 G/DL — SIGNIFICANT CHANGE UP (ref 6–8.3)
PROT UR-MCNC: 30 MG/DL
PROTHROM AB SERPL-ACNC: 12.7 SEC — SIGNIFICANT CHANGE UP (ref 10.5–13.4)
RBC # BLD: 4.89 M/UL — SIGNIFICANT CHANGE UP (ref 3.8–5.2)
RBC # FLD: 11.7 % — SIGNIFICANT CHANGE UP (ref 10.3–14.5)
RH IG SCN BLD-IMP: POSITIVE — SIGNIFICANT CHANGE UP
SODIUM SERPL-SCNC: 139 MMOL/L — SIGNIFICANT CHANGE UP (ref 135–145)
SP GR SPEC: 1.02 — SIGNIFICANT CHANGE UP (ref 1–1.03)
UROBILINOGEN FLD QL: 1 MG/DL — SIGNIFICANT CHANGE UP (ref 0.2–1)
WBC # BLD: 5.83 K/UL — SIGNIFICANT CHANGE UP (ref 3.8–10.5)
WBC # FLD AUTO: 5.83 K/UL — SIGNIFICANT CHANGE UP (ref 3.8–10.5)

## 2023-07-18 PROCEDURE — 74177 CT ABD & PELVIS W/CONTRAST: CPT | Mod: 26,MA

## 2023-07-18 PROCEDURE — 99285 EMERGENCY DEPT VISIT HI MDM: CPT

## 2023-07-18 PROCEDURE — 76830 TRANSVAGINAL US NON-OB: CPT | Mod: 26

## 2023-07-18 RX ORDER — MORPHINE SULFATE 50 MG/1
4 CAPSULE, EXTENDED RELEASE ORAL ONCE
Refills: 0 | Status: DISCONTINUED | OUTPATIENT
Start: 2023-07-18 | End: 2023-07-18

## 2023-07-18 RX ADMIN — MORPHINE SULFATE 4 MILLIGRAM(S): 50 CAPSULE, EXTENDED RELEASE ORAL at 13:47

## 2023-07-18 NOTE — ED PROVIDER NOTE - NS ED ATTENDING STATEMENT MOD
Spiritual Plan of Care    Pt Name: Norma Shore  Pt : 1971  Date: May 30, 2021    Visit Type: In person  Referral Source: Interdisciplinary team  Reason for Visit: Consult  Visited With: Patient  Length of Visit: 35 minutes  Requires Follow-up: No  Spiritual Care Consult Needed: Spiritual Care eval completed  Spiritual Care Visit Preference:     Taxonomy:    · Intended Effects: Convey a calming presence, Build relationship of care and support, Establish rapport and connectedness, Demonstrate caring and concern  · Methods: Encourage sharing of feelings, Encourage self reflection, Offer emotional support, Offer spiritual/Taoist support  · Interventions: Active listening, Acknowledge current situation, Assist with identifying strengths, Ask questions to bring forth feelings, Identify supportive relationship(s), Provide spiritual/Taoist resources, Share words of hope and inspiration, Prayer for healing    Patient Affect at Time of Visit: Open to  Visit, Expressive, Cooperative, Pleasant  Patient Assessment: Anxious, Conflict, Grief, Relies on kalen, Support system  Patient  Intervention: Empathic Listening, Emotional Support, Clarify Feelings, Affirmation, Exploration, Scripture, Reassurance, Prayer  Spiritual Plan of Care: Follow up if requested  Patient Reported Outcome:  Reduced anxiousness  [facility]:   [facility] Phone Number:    [phone]    Pt a bit emotional and overwhelmed with her medical situation. Pt also wanted Sikh communion.  apologize and explain system current policy. Pt shared that she’s been very anxious and stressed and thanked  for visiting and for providing emotional and spiritual support and praying for her healing and recovery emotionally and spiritually.    Attending Only

## 2023-07-18 NOTE — ED PROVIDER NOTE - PROGRESS NOTE DETAILS
Patient signed out by Dr. bateman was pending CT to rule out diverticulitis.  CT negative for diverticulitis will DC with gyn follow-up.

## 2023-07-18 NOTE — ED ADULT NURSE NOTE - OBJECTIVE STATEMENT
patient alert and oriented x4 ambulatory, c/o LLQ abdominal pain. patient is s/p biopsy 7/13 and had transvaginal Ultrasound done on Tuesday. patient states they did not get a big enough sample for the biopsy and it needs to be repeated. patient appears uncomfortable, no acute distress noted. 20G IV placed in RAC, labs sent. medication given per orders. patient educated on fall risk precaution s/p receiving pain medicine. patient educated to call for assistance prior to standing or walking. patient verbalizes understanding.

## 2023-07-18 NOTE — ED PROVIDER NOTE - OBJECTIVE STATEMENT
51 yo F with PMHx pacemaker for bradycardia here with LLQ/left sided pelvic pain that has been occurred intermittently and now worsened. The pt is under the care of a gynecologist who fond her endometrium to be thickened one week ago and performed an endometrial biopsy that did not have sufficient endometrial cells so she was told that she has to repeat it. +nausea but otherwise no f/c, sob, cp, vomiting, dysuria, diarrhea, constipation, weakness.

## 2023-07-18 NOTE — ED PROVIDER NOTE - PATIENT PORTAL LINK FT
You can access the FollowMyHealth Patient Portal offered by [facility] by registering at the following website: [email or web address]. By joining [email or web address]’s FollowMyHealth portal, you will also be able to view your health information using other applications (apps) compatible with our system.

## 2023-07-18 NOTE — ED PROVIDER NOTE - CLINICAL SUMMARY MEDICAL DECISION MAKING FREE TEXT BOX
Will perform transvaginal sono to r/o anatomical abnormality - the transvaginal that was performed last week was in the OB office an only comments on the thickened endometrium. Will also perform CT abdomen/pelvis to evaluate for possible diverticulitis; pre-surgical labs and ua, uc/s will also be ordered to look for signs of infection.

## 2023-07-20 LAB
CULTURE RESULTS: SIGNIFICANT CHANGE UP
SPECIMEN SOURCE: SIGNIFICANT CHANGE UP

## 2023-08-25 NOTE — PATIENT PROFILE ADULT. - FUNCTIONAL SCREEN CURRENT LEVEL: SWALLOWING (IF SCORE 2 OR MORE FOR ANY ITEM, CONSULT REHAB SERVICES), MLM)
What Is The Reason For Today's Visit?: History of Melanoma (0) swallows foods/liquids without difficulty

## 2024-03-05 NOTE — PATIENT PROFILE ADULT. - TEACHING/LEARNING CULTURAL CONSIDERATIONS
Pt presents to ED from [facility] for SOB. Pt A&Ox4, conversive in full sentences. S/P CABG and pleural effusion R. per staff, pt was sating at 89on RA, pt on O2 upon arrival at 2 lpm va NC with sat at 97%. Denies CP. EKG in prog. none

## 2024-08-27 NOTE — H&P CARDIOLOGY - WEIGHT IN LBS
How Severe Is Your Rash?: moderate Is This A New Presentation, Or A Follow-Up?: Rash Additional History: Patient currently using Triamcinolone and is helping a little with texture. 126.9

## 2025-04-17 ENCOUNTER — APPOINTMENT (OUTPATIENT)
Dept: ORTHOPEDIC SURGERY | Facility: CLINIC | Age: 55
End: 2025-04-17

## 2025-05-11 NOTE — DISCHARGE NOTE ADULT - HOSPITAL COURSE
47 yo F h.o GERD, PVC s/p ablation 2017 with one week of palpitations. + SOB associated with palpitations. Also reported orthopnea ( sleeps on 3 pillows) x 1 year. NO chest pain, SOB at rest, [place], PND, , diaphoresis, lightheadedness, dizziness, syncope, increased lower extremity edema, fever chills, malaise, myalgias, anorexia, weight changes ( loss or gain), night sweats, generalized fatigue abdominal pain, N/V/C/D BRBPR, melena, urinary symptoms, cough, and wheezing.     Test Results:  CXR: External pacer pads overlie lower left hemithorax. Clear lungs. No pleural effusions or pneumothorax. Stable sightly prominent appearing cardiac and mediastinal silhouettes. Trachea midline.Unremarkable osseous structures.  TTE: Mildly thickened mitral valve. Minimal mitral  regurgitation.  2. Normal left ventricular internal dimensions and wall  thicknesses.  3. Endocardium not well visualized; grossly normal left  ventricular systolic function.  4. The right ventricle is not well visualized; grossly  normal right ventricular systolic function.  5. Normal tricuspid valve. Mild tricuspid regurgitation.  6. Estimated pulmonary artery systolic pressure equals 37  mm Hg, assuming right atrial pressure equals 10  mm Hg,  consistent with borderline pulmonary hypertension.  ekg:  trop< 6    9/10 Cardiac MRI: No evidence of infiltrative cardiomyopathy.  9/11 CATH: EF 60, normal coronary arteries, RCA non-dominant; RFA access    + Exit block , bradycardia - symptomatic cindi, on no avn med at home, no recent otc meds  echo nl with nl lv and valve fxn  cardiac MRI for neg for infiltrative cmp   Pt refusing PPM placement     + Chest pain, atypical, resolved - could be secondary to gerd vs symptomatic cindi  ecg with  rbbb no acute ischemic changes  echo with nl lv fxn  continue asa, ppi   s/p C with nml cor    Pt refusing PPM, will d/c per Dr. Hammond and EP. [Nasal Discharge] : nasal discharge [Nasal Stuffiness] : nasal congestion [Cough] : cough [Acting Fussy] : not acting ~L fussy [Fever] : no fever [Wgt Loss (___ Lbs)] : no recent weight loss [Pallor] : not pale [Eye Discharge] : no eye discharge [Redness] : no redness [Sore Throat] : no sore throat [Earache] : no earache [Cyanosis] : no cyanosis [Edema] : no edema [Diaphoresis] : not diaphoretic [Chest Pain] : no chest pain or discomfort [Exercise Intolerance] : no persistence of exercise intolerance [Fast HR] : no tachycardia [Tachypnea] : not tachypneic [Wheezing] : no wheezing [Being A Poor Eater] : not a poor eater [Vomiting] : no vomiting [Diarrhea] : no diarrhea [Decrease In Appetite] : appetite not decreased [Abdominal Pain] : no abdominal pain [Fainting (Syncope)] : no fainting [Seizure] : no seizures [Hypotonicity (Flaccid)] : not hypotonic [Limping] : no limping [Joint Pains] : no arthralgias [Joint Swelling] : no joint swelling [Rash] : no rash [Wound problems] : no wound problems [Bruising] : no tendency for easy bruising [Nosebleeds] : no epistaxis [Swollen Glands] : no lymphadenopathy [Sleep Disturbances] : ~T no sleep disturbances [Hyperactive] : no hyperactive behavior [Failure To Thrive] : no failure to thrive [Short Stature] : short stature was not noted [Dec Urine Output] : no oliguria [FreeTextEntry1] : eczema rash

## 2025-06-17 NOTE — ED ADULT TRIAGE NOTE - NS ED NURSE DIRECT TO ROOM YN
Specialty Pharmacy Patient Management Program  Prescription Refill Request     Patient currently fills medications at  Pharmacy. Needing refill(s) on the following:      Requested Prescriptions     Pending Prescriptions Disp Refills    dapagliflozin-metformin HCl ER (Xigduo XR) 5-1000 MG tablet 60 tablet 0     Sig: Take 1 tablet by mouth 2 (Two) Times a Day.    Insulin Disposable Pump (Omnipod 5 PbpF0E2 Pods Gen 5) misc 15 each 0     Sig: Use 1 pod Every Other Day.    Continuous Glucose Sensor (Dexcom G7 Sensor) misc 3 each 0     Sig: Use 1 each Every 10 (Ten) Days.    fenofibrate (TRICOR) 145 MG tablet 30 tablet 0     Sig: Take 1 tablet by mouth Daily.    Insulin Aspart (NovoLOG) 100 UNIT/ML injection 30 mL 0     Sig: Use 100 units Daily via Omnipod as directed.       Last visit: 11/4/24  Next visit: 7/14/25    Pended for PORSHA Shaw to review, and approve if appropriate.       Radha Rowland, PharmD  Clinical Specialty Pharmacist, Endocrinology  6/17/2025  12:43 EDT      No